# Patient Record
Sex: MALE | Race: WHITE
[De-identification: names, ages, dates, MRNs, and addresses within clinical notes are randomized per-mention and may not be internally consistent; named-entity substitution may affect disease eponyms.]

---

## 2017-04-10 ENCOUNTER — RX RENEWAL (OUTPATIENT)
Age: 62
End: 2017-04-10

## 2017-04-10 ENCOUNTER — MEDICATION RENEWAL (OUTPATIENT)
Age: 62
End: 2017-04-10

## 2017-05-23 ENCOUNTER — TRANSCRIPTION ENCOUNTER (OUTPATIENT)
Age: 62
End: 2017-05-23

## 2017-07-07 ENCOUNTER — APPOINTMENT (OUTPATIENT)
Dept: INTERNAL MEDICINE | Facility: CLINIC | Age: 62
End: 2017-07-07

## 2017-10-10 ENCOUNTER — RX RENEWAL (OUTPATIENT)
Age: 62
End: 2017-10-10

## 2018-04-11 ENCOUNTER — RX RENEWAL (OUTPATIENT)
Age: 63
End: 2018-04-11

## 2018-08-20 ENCOUNTER — MEDICATION RENEWAL (OUTPATIENT)
Age: 63
End: 2018-08-20

## 2018-08-23 ENCOUNTER — LABORATORY RESULT (OUTPATIENT)
Age: 63
End: 2018-08-23

## 2018-08-23 ENCOUNTER — RESULT REVIEW (OUTPATIENT)
Age: 63
End: 2018-08-23

## 2018-08-23 ENCOUNTER — APPOINTMENT (OUTPATIENT)
Dept: INTERNAL MEDICINE | Facility: CLINIC | Age: 63
End: 2018-08-23
Payer: COMMERCIAL

## 2018-08-23 VITALS
RESPIRATION RATE: 16 BRPM | WEIGHT: 169 LBS | BODY MASS INDEX: 24.2 KG/M2 | HEIGHT: 70 IN | OXYGEN SATURATION: 95 % | SYSTOLIC BLOOD PRESSURE: 128 MMHG | HEART RATE: 70 BPM | TEMPERATURE: 98.9 F | DIASTOLIC BLOOD PRESSURE: 80 MMHG

## 2018-08-23 PROCEDURE — 99214 OFFICE O/P EST MOD 30 MIN: CPT

## 2018-08-23 RX ORDER — LEVOTHYROXINE SODIUM 0.1 MG/1
100 TABLET ORAL DAILY
Qty: 30 | Refills: 2 | Status: DISCONTINUED | COMMUNITY
Start: 2017-04-10 | End: 1900-01-01

## 2018-08-23 RX ORDER — OCRELIZUMAB 300 MG/10ML
300 INJECTION INTRAVENOUS
Qty: 20 | Refills: 0 | Status: ACTIVE | COMMUNITY
Start: 2018-06-16

## 2018-08-23 NOTE — ASSESSMENT
[FreeTextEntry1] : Urinary tract infection, improvement in symptoms\par Multiple Sclerosis\par Hypothyroidism\par SEE Problem list in chart.

## 2018-08-23 NOTE — HISTORY OF PRESENT ILLNESS
[FreeTextEntry8] : Mr Monte is a very pleasant 63 y/o male with multiple sclerosis that has not been seen in our office in 2 years.  His wife called us to request medication for him for a UTI, he was experiencing urinary frequency, urgency with incontinence and mild burning sensation.  \par Cipro 500 mg, 1 Tab BID x 10 days was prescribed empirically and he is now on day 3 of treatment.  He reports that he feels improvement, no burning sensation, there is a decrease in frequency and urgency.  Denies abdominal pain, chills or fevers.\par He has had urologic problems in the past but even at our insistence he did not follow up with Dr. Garza on a regular basis.\par He DOES have an appointment with Urology in 6 days.\par In his difficult history with MS Mr. Monte has tried stem cell infusion in [place] and is currently taking Ocrevus through close follow up with Dr. Henderson.  At this point he is contemplating discontinuing Ocrevus therapy because "it makes him feel awful and does not think it is working."\par He started an OTC supplement called Protandim three weeks ago, an herbal + supplement that is "supposed to eliminate free radicals in cells" and he is hoping to benefit and feel better with this.\par Patient has been noncompliant with well visits and is overdue for comprehensive labs.  He has not had thyroid function testing done recently to his knowledge, and stopped Synthroid medication "a while ago."\par Please see previous notes.

## 2018-08-23 NOTE — PHYSICAL EXAM
[No Acute Distress] : no acute distress [Well Nourished] : well nourished [Normal Sclera/Conjunctiva] : normal sclera/conjunctiva [PERRL] : pupils equal round and reactive to light [Supple] : supple [No Respiratory Distress] : no respiratory distress  [Clear to Auscultation] : lungs were clear to auscultation bilaterally [Normal Rate] : normal rate  [Regular Rhythm] : with a regular rhythm [Normal S1, S2] : normal S1 and S2 [No Edema] : there was no peripheral edema [Soft] : abdomen soft [Non Tender] : non-tender [Normal Bowel Sounds] : normal bowel sounds [No Rash] : no rash [Coordination Grossly Intact] : coordination grossly intact [Normal Affect] : the affect was normal [Normal Insight/Judgement] : insight and judgment were intact [de-identified] : decreased strength and tone, muscle wasting lower extremities. [de-identified] : wheelchair bound

## 2018-08-23 NOTE — REVIEW OF SYSTEMS
[Fatigue] : fatigue [Chills] : no chills [Abdominal Pain] : no abdominal pain [Nausea] : no nausea [Incontinence] : incontinence [Hematuria] : no hematuria [Frequency] : frequency [Dizziness] : no dizziness [Fainting] : no fainting [Confusion] : no confusion [Unsteady Walk] : ataxia [Memory Loss] : memory loss [Suicidal] : not suicidal [Insomnia] : no insomnia [Anxiety] : no anxiety [Depression] : depression [Negative] : Musculoskeletal [FreeTextEntry8] : urgency [de-identified] : Under control with medications

## 2018-08-23 NOTE — PLAN
[FreeTextEntry1] : Patient will continue Cipro as prescribed 3 days ago for a full 10 day course.\par He assures me that he will keep appointment with Dr. Garza August 29 th.\par Increase fluids.\par Advised him need for comprehensive labs, especially since he stopped Synthroid on his own, agrees to have labs drawn.\par Call office if UTI symptoms do not continue to improve.\par Continue to follow closely with Dr. Pritchard.\par Encouraged CP with DB in near future.

## 2018-08-24 ENCOUNTER — RX RENEWAL (OUTPATIENT)
Age: 63
End: 2018-08-24

## 2019-02-12 ENCOUNTER — RX RENEWAL (OUTPATIENT)
Age: 64
End: 2019-02-12

## 2019-04-15 ENCOUNTER — APPOINTMENT (OUTPATIENT)
Dept: INTERNAL MEDICINE | Facility: CLINIC | Age: 64
End: 2019-04-15
Payer: COMMERCIAL

## 2019-04-15 ENCOUNTER — NON-APPOINTMENT (OUTPATIENT)
Age: 64
End: 2019-04-15

## 2019-04-15 VITALS
BODY MASS INDEX: 24.34 KG/M2 | HEART RATE: 76 BPM | SYSTOLIC BLOOD PRESSURE: 134 MMHG | RESPIRATION RATE: 18 BRPM | TEMPERATURE: 98.3 F | WEIGHT: 170 LBS | OXYGEN SATURATION: 96 % | DIASTOLIC BLOOD PRESSURE: 82 MMHG | HEIGHT: 70 IN

## 2019-04-15 DIAGNOSIS — J06.9 ACUTE UPPER RESPIRATORY INFECTION, UNSPECIFIED: ICD-10-CM

## 2019-04-15 DIAGNOSIS — R06.09 OTHER FORMS OF DYSPNEA: ICD-10-CM

## 2019-04-15 DIAGNOSIS — N30.00 ACUTE CYSTITIS W/OUT HEMATURIA: ICD-10-CM

## 2019-04-15 DIAGNOSIS — N20.0 CALCULUS OF KIDNEY: ICD-10-CM

## 2019-04-15 PROCEDURE — 99215 OFFICE O/P EST HI 40 MIN: CPT | Mod: 25

## 2019-04-15 PROCEDURE — 94060 EVALUATION OF WHEEZING: CPT

## 2019-04-15 RX ORDER — MODAFINIL 200 MG/1
200 TABLET ORAL
Refills: 0 | Status: ACTIVE | COMMUNITY

## 2019-04-15 RX ORDER — CIPROFLOXACIN HYDROCHLORIDE 500 MG/1
500 TABLET, FILM COATED ORAL
Qty: 20 | Refills: 0 | Status: DISCONTINUED | COMMUNITY
Start: 2018-08-20 | End: 2019-04-15

## 2019-04-15 RX ORDER — CLONIDINE 0.1 MG/24H
0.1 PATCH, EXTENDED RELEASE TRANSDERMAL
Refills: 0 | Status: ACTIVE | COMMUNITY

## 2019-04-15 RX ORDER — COLD-HOT PACK
EACH MISCELLANEOUS
Refills: 0 | Status: ACTIVE | COMMUNITY

## 2019-04-15 RX ORDER — ALBUTEROL SULFATE 90 UG/1
108 AEROSOL, METERED RESPIRATORY (INHALATION)
Refills: 0 | Status: ACTIVE | COMMUNITY

## 2019-04-15 RX ORDER — CLONAZEPAM 1 MG/1
1 TABLET ORAL
Refills: 0 | Status: ACTIVE | COMMUNITY

## 2019-04-15 RX ORDER — AMOXICILLIN AND CLAVULANATE POTASSIUM 875; 125 MG/1; MG/1
875-125 TABLET, COATED ORAL TWICE DAILY
Qty: 12 | Refills: 0 | Status: DISCONTINUED | COMMUNITY
Start: 2019-02-25 | End: 2019-04-15

## 2019-04-15 NOTE — HISTORY OF PRESENT ILLNESS
[FreeTextEntry1] : Followup evaluation [de-identified] : Mr. Monte presents for followup evaluation accompanied by his wife. Patient has a history of multiple sclerosis for many years. He is followed by Dr. Aj Monroy for neurology. Patient was recently admitted to [facility] with urinary tract infection and sepsis. Mr. Monte has a history of recurrent urinary tract infection secondary to his multiple sclerosis. He has just finished a course of ciprofloxacin. Neurology followup by Dr. Jerome Marie. While in [facility] the patient had a CT scan of the abdomen and pelvis which apparently was suspicious for esophageal carcinoma. After discharge Mr. Monte had an upper endoscopy by Dr. Barry Glanzmann which showed no evidence of esophageal carcinoma. Patient was also given the Colguard test. Mr. Monte does occasionally have shortness of breath with exertion. He is wheelchair-bound. He had recently been on Ocrevus infusions for his multiple sclerosis however her these are discontinued. He complains of episodes of diplopia. He has a history of nystagmus.

## 2019-04-15 NOTE — PHYSICAL EXAM
[No Acute Distress] : no acute distress [Well Developed] : well developed [Well Nourished] : well nourished [Well-Appearing] : well-appearing [Normal Sclera/Conjunctiva] : normal sclera/conjunctiva [PERRL] : pupils equal round and reactive to light [EOMI] : extraocular movements intact [Normal Outer Ear/Nose] : the outer ears and nose were normal in appearance [Normal Oropharynx] : the oropharynx was normal [No JVD] : no jugular venous distention [No Lymphadenopathy] : no lymphadenopathy [Supple] : supple [Thyroid Normal, No Nodules] : the thyroid was normal and there were no nodules present [No Respiratory Distress] : no respiratory distress  [Clear to Auscultation] : lungs were clear to auscultation bilaterally [No Accessory Muscle Use] : no accessory muscle use [Normal Rate] : normal rate  [Regular Rhythm] : with a regular rhythm [No Murmur] : no murmur heard [Normal S1, S2] : normal S1 and S2 [No Carotid Bruits] : no carotid bruits [No Abdominal Bruit] : a ~M bruit was not heard ~T in the abdomen [No Varicosities] : no varicosities [Pedal Pulses Present] : the pedal pulses are present [No Extremity Clubbing/Cyanosis] : no extremity clubbing/cyanosis [No Edema] : there was no peripheral edema [No Palpable Aorta] : no palpable aorta [Soft] : abdomen soft [Non-distended] : non-distended [Non Tender] : non-tender [No HSM] : no HSM [No Masses] : no abdominal mass palpated [Normal Bowel Sounds] : normal bowel sounds [Normal Posterior Cervical Nodes] : no posterior cervical lymphadenopathy [Normal Anterior Cervical Nodes] : no anterior cervical lymphadenopathy [No Spinal Tenderness] : no spinal tenderness [No CVA Tenderness] : no CVA  tenderness [No Joint Swelling] : no joint swelling [No Rash] : no rash [Normal Gait] : normal gait [Coordination Grossly Intact] : coordination grossly intact [No Focal Deficits] : no focal deficits [Deep Tendon Reflexes (DTR)] : deep tendon reflexes were 2+ and symmetric [Normal Affect] : the affect was normal [Normal Insight/Judgement] : insight and judgment were intact [de-identified] : mild spasticity [de-identified] : Right exophthalmos

## 2019-04-15 NOTE — DATA REVIEWED
[FreeTextEntry1] : Spirometry pre-and postbronchodilator therapy shows moderate restrictive lung disease. FEV1 is 2.16 L which is 64% predicted. FEV1 percent is 77%.

## 2019-04-15 NOTE — PHYSICAL EXAM
[No Acute Distress] : no acute distress [Well Developed] : well developed [Well Nourished] : well nourished [Normal Sclera/Conjunctiva] : normal sclera/conjunctiva [Well-Appearing] : well-appearing [EOMI] : extraocular movements intact [PERRL] : pupils equal round and reactive to light [Normal Outer Ear/Nose] : the outer ears and nose were normal in appearance [Normal Oropharynx] : the oropharynx was normal [No JVD] : no jugular venous distention [Supple] : supple [No Lymphadenopathy] : no lymphadenopathy [Thyroid Normal, No Nodules] : the thyroid was normal and there were no nodules present [No Respiratory Distress] : no respiratory distress  [Clear to Auscultation] : lungs were clear to auscultation bilaterally [No Accessory Muscle Use] : no accessory muscle use [Normal Rate] : normal rate  [Regular Rhythm] : with a regular rhythm [Normal S1, S2] : normal S1 and S2 [No Murmur] : no murmur heard [No Carotid Bruits] : no carotid bruits [No Abdominal Bruit] : a ~M bruit was not heard ~T in the abdomen [No Varicosities] : no varicosities [Pedal Pulses Present] : the pedal pulses are present [No Edema] : there was no peripheral edema [No Extremity Clubbing/Cyanosis] : no extremity clubbing/cyanosis [No Palpable Aorta] : no palpable aorta [Soft] : abdomen soft [Non-distended] : non-distended [Non Tender] : non-tender [No HSM] : no HSM [No Masses] : no abdominal mass palpated [Normal Bowel Sounds] : normal bowel sounds [Normal Posterior Cervical Nodes] : no posterior cervical lymphadenopathy [Normal Anterior Cervical Nodes] : no anterior cervical lymphadenopathy [No CVA Tenderness] : no CVA  tenderness [No Spinal Tenderness] : no spinal tenderness [No Joint Swelling] : no joint swelling [No Rash] : no rash [Normal Gait] : normal gait [Coordination Grossly Intact] : coordination grossly intact [No Focal Deficits] : no focal deficits [Deep Tendon Reflexes (DTR)] : deep tendon reflexes were 2+ and symmetric [Normal Affect] : the affect was normal [Normal Insight/Judgement] : insight and judgment were intact [de-identified] : mild spasticity [de-identified] : Right exophthalmos

## 2019-04-15 NOTE — PLAN
[FreeTextEntry1] : Debby Summers is a 63-year-old male with a history of multiple sclerosis. Heels has a history of recurrent urinary tract infections. The patient was recently in [facility] for an episode of urinary tract infection with sepsis. He will continue urology follow up with Dr. Marie. Mr. Summers will also continue follow with Dr. tay for his multiple sclerosis. Patient is be given a prescription for comprehensive blood profile. He will continue on metoprolol XL 12.5 mg daily. Followup in 6 weeks.

## 2019-04-15 NOTE — HISTORY OF PRESENT ILLNESS
[FreeTextEntry1] : Followup evaluation [de-identified] : Mr. Monte presents for followup evaluation accompanied by his wife. Patient has a history of multiple sclerosis for many years. He is followed by Dr. Aj Monroy for neurology. Patient was recently admitted to [facility] with urinary tract infection and sepsis. Mr. Monte has a history of recurrent urinary tract infection secondary to his multiple sclerosis. He has just finished a course of ciprofloxacin. Neurology followup by Dr. Jerome Marie. While in [facility] the patient had a CT scan of the abdomen and pelvis which apparently was suspicious for esophageal carcinoma. After discharge Mr. Monte had an upper endoscopy by Dr. Barry Glanzmann which showed no evidence of esophageal carcinoma. Patient was also given the Colguard test. Mr. Monte does occasionally have shortness of breath with exertion. He is wheelchair-bound. He had recently been on Ocrevus infusions for his multiple sclerosis however her these are discontinued. He complains of episodes of diplopia. He has a history of nystagmus.

## 2019-04-15 NOTE — PLAN
[FreeTextEntry1] : Debby Summers is a 63-year-old male with a history of multiple sclerosis. Heels has a history of recurrent urinary tract infections. The patient was recently in [facility] for an episode of urinary tract infection with sepsis. He will continue urology follow up with Dr. Marie. Mr. Summers will also continue follow with Dr. tay for his multiple sclerosis. Patient is be given a prescription for comprehensive blood profile. He will continue on metoprolol XL 12.5 mg daily. Followup in 6 weeks.

## 2019-04-22 ENCOUNTER — APPOINTMENT (OUTPATIENT)
Dept: FAMILY MEDICINE | Facility: CLINIC | Age: 64
End: 2019-04-22

## 2019-04-29 ENCOUNTER — MEDICATION RENEWAL (OUTPATIENT)
Age: 64
End: 2019-04-29

## 2019-05-08 ENCOUNTER — INPATIENT (INPATIENT)
Facility: HOSPITAL | Age: 64
LOS: 4 days | Discharge: HOME | End: 2019-05-13
Attending: INTERNAL MEDICINE | Admitting: INTERNAL MEDICINE
Payer: COMMERCIAL

## 2019-05-08 VITALS
RESPIRATION RATE: 18 BRPM | HEART RATE: 112 BPM | OXYGEN SATURATION: 93 % | TEMPERATURE: 100 F | DIASTOLIC BLOOD PRESSURE: 86 MMHG | WEIGHT: 169.98 LBS | SYSTOLIC BLOOD PRESSURE: 166 MMHG | HEIGHT: 70 IN

## 2019-05-08 LAB
ALBUMIN SERPL ELPH-MCNC: 2.8 G/DL — LOW (ref 3.3–5)
ALBUMIN SERPL ELPH-MCNC: 3.5 G/DL — SIGNIFICANT CHANGE UP (ref 3.3–5)
ALP SERPL-CCNC: 114 U/L — SIGNIFICANT CHANGE UP (ref 40–120)
ALP SERPL-CCNC: 126 U/L — HIGH (ref 40–120)
ALT FLD-CCNC: 49 U/L — SIGNIFICANT CHANGE UP (ref 12–78)
ALT FLD-CCNC: 59 U/L — SIGNIFICANT CHANGE UP (ref 12–78)
ANION GAP SERPL CALC-SCNC: 5 MMOL/L — SIGNIFICANT CHANGE UP (ref 5–17)
ANION GAP SERPL CALC-SCNC: 5 MMOL/L — SIGNIFICANT CHANGE UP (ref 5–17)
APPEARANCE UR: CLEAR — SIGNIFICANT CHANGE UP
APTT BLD: 32 SEC — SIGNIFICANT CHANGE UP (ref 27.5–36.3)
AST SERPL-CCNC: 24 U/L — SIGNIFICANT CHANGE UP (ref 15–37)
AST SERPL-CCNC: 31 U/L — SIGNIFICANT CHANGE UP (ref 15–37)
BACTERIA # UR AUTO: ABNORMAL
BASOPHILS # BLD AUTO: 0.02 K/UL — SIGNIFICANT CHANGE UP (ref 0–0.2)
BASOPHILS # BLD AUTO: 0.02 K/UL — SIGNIFICANT CHANGE UP (ref 0–0.2)
BASOPHILS NFR BLD AUTO: 0.2 % — SIGNIFICANT CHANGE UP (ref 0–2)
BASOPHILS NFR BLD AUTO: 0.3 % — SIGNIFICANT CHANGE UP (ref 0–2)
BILIRUB SERPL-MCNC: 0.5 MG/DL — SIGNIFICANT CHANGE UP (ref 0.2–1.2)
BILIRUB SERPL-MCNC: 0.5 MG/DL — SIGNIFICANT CHANGE UP (ref 0.2–1.2)
BILIRUB UR-MCNC: NEGATIVE — SIGNIFICANT CHANGE UP
BUN SERPL-MCNC: 10 MG/DL — SIGNIFICANT CHANGE UP (ref 7–23)
BUN SERPL-MCNC: 13 MG/DL — SIGNIFICANT CHANGE UP (ref 7–23)
CALCIUM SERPL-MCNC: 8.2 MG/DL — LOW (ref 8.5–10.1)
CALCIUM SERPL-MCNC: 8.6 MG/DL — SIGNIFICANT CHANGE UP (ref 8.5–10.1)
CHLORIDE SERPL-SCNC: 106 MMOL/L — SIGNIFICANT CHANGE UP (ref 96–108)
CHLORIDE SERPL-SCNC: 108 MMOL/L — SIGNIFICANT CHANGE UP (ref 96–108)
CO2 SERPL-SCNC: 25 MMOL/L — SIGNIFICANT CHANGE UP (ref 22–31)
CO2 SERPL-SCNC: 27 MMOL/L — SIGNIFICANT CHANGE UP (ref 22–31)
COLOR SPEC: YELLOW — SIGNIFICANT CHANGE UP
CREAT SERPL-MCNC: 0.98 MG/DL — SIGNIFICANT CHANGE UP (ref 0.5–1.3)
CREAT SERPL-MCNC: 1.01 MG/DL — SIGNIFICANT CHANGE UP (ref 0.5–1.3)
DIFF PNL FLD: ABNORMAL
EOSINOPHIL # BLD AUTO: 0.03 K/UL — SIGNIFICANT CHANGE UP (ref 0–0.5)
EOSINOPHIL # BLD AUTO: 0.05 K/UL — SIGNIFICANT CHANGE UP (ref 0–0.5)
EOSINOPHIL NFR BLD AUTO: 0.4 % — SIGNIFICANT CHANGE UP (ref 0–6)
EOSINOPHIL NFR BLD AUTO: 0.6 % — SIGNIFICANT CHANGE UP (ref 0–6)
EPI CELLS # UR: SIGNIFICANT CHANGE UP
GLUCOSE SERPL-MCNC: 196 MG/DL — HIGH (ref 70–99)
GLUCOSE SERPL-MCNC: 208 MG/DL — HIGH (ref 70–99)
GLUCOSE UR QL: 250 MG/DL
HBA1C BLD-MCNC: 7.4 % — HIGH (ref 4–5.6)
HCT VFR BLD CALC: 39.1 % — SIGNIFICANT CHANGE UP (ref 39–50)
HCT VFR BLD CALC: 42 % — SIGNIFICANT CHANGE UP (ref 39–50)
HCV AB S/CO SERPL IA: 0.16 S/CO — SIGNIFICANT CHANGE UP (ref 0–0.99)
HCV AB SERPL-IMP: SIGNIFICANT CHANGE UP
HGB BLD-MCNC: 13 G/DL — SIGNIFICANT CHANGE UP (ref 13–17)
HGB BLD-MCNC: 14.2 G/DL — SIGNIFICANT CHANGE UP (ref 13–17)
HPIV1 RNA SPEC QL NAA+PROBE: DETECTED
IMM GRANULOCYTES NFR BLD AUTO: 0.5 % — SIGNIFICANT CHANGE UP (ref 0–1.5)
IMM GRANULOCYTES NFR BLD AUTO: 0.6 % — SIGNIFICANT CHANGE UP (ref 0–1.5)
INR BLD: 1.04 RATIO — SIGNIFICANT CHANGE UP (ref 0.88–1.16)
KETONES UR-MCNC: ABNORMAL
LACTATE SERPL-SCNC: 1.8 MMOL/L — SIGNIFICANT CHANGE UP (ref 0.7–2)
LEUKOCYTE ESTERASE UR-ACNC: ABNORMAL
LYMPHOCYTES # BLD AUTO: 0.74 K/UL — LOW (ref 1–3.3)
LYMPHOCYTES # BLD AUTO: 1.91 K/UL — SIGNIFICANT CHANGE UP (ref 1–3.3)
LYMPHOCYTES # BLD AUTO: 21.2 % — SIGNIFICANT CHANGE UP (ref 13–44)
LYMPHOCYTES # BLD AUTO: 9.4 % — LOW (ref 13–44)
MAGNESIUM SERPL-MCNC: 1.8 MG/DL — SIGNIFICANT CHANGE UP (ref 1.6–2.6)
MCHC RBC-ENTMCNC: 31.3 PG — SIGNIFICANT CHANGE UP (ref 27–34)
MCHC RBC-ENTMCNC: 31.6 PG — SIGNIFICANT CHANGE UP (ref 27–34)
MCHC RBC-ENTMCNC: 33.2 GM/DL — SIGNIFICANT CHANGE UP (ref 32–36)
MCHC RBC-ENTMCNC: 33.8 GM/DL — SIGNIFICANT CHANGE UP (ref 32–36)
MCV RBC AUTO: 93.5 FL — SIGNIFICANT CHANGE UP (ref 80–100)
MCV RBC AUTO: 94.2 FL — SIGNIFICANT CHANGE UP (ref 80–100)
MONOCYTES # BLD AUTO: 0.7 K/UL — SIGNIFICANT CHANGE UP (ref 0–0.9)
MONOCYTES # BLD AUTO: 0.99 K/UL — HIGH (ref 0–0.9)
MONOCYTES NFR BLD AUTO: 11 % — SIGNIFICANT CHANGE UP (ref 2–14)
MONOCYTES NFR BLD AUTO: 8.9 % — SIGNIFICANT CHANGE UP (ref 2–14)
NEUTROPHILS # BLD AUTO: 5.99 K/UL — SIGNIFICANT CHANGE UP (ref 1.8–7.4)
NEUTROPHILS # BLD AUTO: 6.32 K/UL — SIGNIFICANT CHANGE UP (ref 1.8–7.4)
NEUTROPHILS NFR BLD AUTO: 66.4 % — SIGNIFICANT CHANGE UP (ref 43–77)
NEUTROPHILS NFR BLD AUTO: 80.5 % — HIGH (ref 43–77)
NITRITE UR-MCNC: POSITIVE
NRBC # BLD: 0 /100 WBCS — SIGNIFICANT CHANGE UP (ref 0–0)
NRBC # BLD: 0 /100 WBCS — SIGNIFICANT CHANGE UP (ref 0–0)
PH UR: 5 — SIGNIFICANT CHANGE UP (ref 5–8)
PHOSPHATE SERPL-MCNC: 2.5 MG/DL — SIGNIFICANT CHANGE UP (ref 2.5–4.5)
PLATELET # BLD AUTO: 161 K/UL — SIGNIFICANT CHANGE UP (ref 150–400)
PLATELET # BLD AUTO: 196 K/UL — SIGNIFICANT CHANGE UP (ref 150–400)
POTASSIUM SERPL-MCNC: 3.5 MMOL/L — SIGNIFICANT CHANGE UP (ref 3.5–5.3)
POTASSIUM SERPL-MCNC: 3.7 MMOL/L — SIGNIFICANT CHANGE UP (ref 3.5–5.3)
POTASSIUM SERPL-SCNC: 3.5 MMOL/L — SIGNIFICANT CHANGE UP (ref 3.5–5.3)
POTASSIUM SERPL-SCNC: 3.7 MMOL/L — SIGNIFICANT CHANGE UP (ref 3.5–5.3)
PROT SERPL-MCNC: 6.1 GM/DL — SIGNIFICANT CHANGE UP (ref 6–8.3)
PROT SERPL-MCNC: 7.2 GM/DL — SIGNIFICANT CHANGE UP (ref 6–8.3)
PROT UR-MCNC: 30 MG/DL
PROTHROM AB SERPL-ACNC: 11.6 SEC — SIGNIFICANT CHANGE UP (ref 10–12.9)
RAPID RVP RESULT: DETECTED
RBC # BLD: 4.15 M/UL — LOW (ref 4.2–5.8)
RBC # BLD: 4.49 M/UL — SIGNIFICANT CHANGE UP (ref 4.2–5.8)
RBC # FLD: 12.5 % — SIGNIFICANT CHANGE UP (ref 10.3–14.5)
RBC # FLD: 12.6 % — SIGNIFICANT CHANGE UP (ref 10.3–14.5)
RBC CASTS # UR COMP ASSIST: SIGNIFICANT CHANGE UP /HPF (ref 0–4)
SODIUM SERPL-SCNC: 138 MMOL/L — SIGNIFICANT CHANGE UP (ref 135–145)
SODIUM SERPL-SCNC: 138 MMOL/L — SIGNIFICANT CHANGE UP (ref 135–145)
SP GR SPEC: 1.02 — SIGNIFICANT CHANGE UP (ref 1.01–1.02)
UROBILINOGEN FLD QL: NEGATIVE MG/DL — SIGNIFICANT CHANGE UP
WBC # BLD: 7.85 K/UL — SIGNIFICANT CHANGE UP (ref 3.8–10.5)
WBC # BLD: 9.01 K/UL — SIGNIFICANT CHANGE UP (ref 3.8–10.5)
WBC # FLD AUTO: 7.85 K/UL — SIGNIFICANT CHANGE UP (ref 3.8–10.5)
WBC # FLD AUTO: 9.01 K/UL — SIGNIFICANT CHANGE UP (ref 3.8–10.5)
WBC UR QL: ABNORMAL

## 2019-05-08 PROCEDURE — 93010 ELECTROCARDIOGRAM REPORT: CPT

## 2019-05-08 PROCEDURE — 71250 CT THORAX DX C-: CPT | Mod: 26

## 2019-05-08 PROCEDURE — 99285 EMERGENCY DEPT VISIT HI MDM: CPT | Mod: 25

## 2019-05-08 PROCEDURE — 71045 X-RAY EXAM CHEST 1 VIEW: CPT | Mod: 26

## 2019-05-08 RX ORDER — CEFTRIAXONE 500 MG/1
1000 INJECTION, POWDER, FOR SOLUTION INTRAMUSCULAR; INTRAVENOUS ONCE
Qty: 0 | Refills: 0 | Status: COMPLETED | OUTPATIENT
Start: 2019-05-08 | End: 2019-05-08

## 2019-05-08 RX ORDER — HEPARIN SODIUM 5000 [USP'U]/ML
5000 INJECTION INTRAVENOUS; SUBCUTANEOUS EVERY 12 HOURS
Qty: 0 | Refills: 0 | Status: DISCONTINUED | OUTPATIENT
Start: 2019-05-08 | End: 2019-05-13

## 2019-05-08 RX ORDER — AZITHROMYCIN 500 MG/1
500 TABLET, FILM COATED ORAL DAILY
Qty: 0 | Refills: 0 | Status: DISCONTINUED | OUTPATIENT
Start: 2019-05-08 | End: 2019-05-12

## 2019-05-08 RX ORDER — METOPROLOL TARTRATE 50 MG
25 TABLET ORAL DAILY
Qty: 0 | Refills: 0 | Status: DISCONTINUED | OUTPATIENT
Start: 2019-05-08 | End: 2019-05-13

## 2019-05-08 RX ORDER — VANCOMYCIN HCL 1 G
1000 VIAL (EA) INTRAVENOUS EVERY 12 HOURS
Qty: 0 | Refills: 0 | Status: DISCONTINUED | OUTPATIENT
Start: 2019-05-08 | End: 2019-05-08

## 2019-05-08 RX ORDER — POLYETHYLENE GLYCOL 3350 17 G/17G
17 POWDER, FOR SOLUTION ORAL
Qty: 0 | Refills: 0 | Status: DISCONTINUED | OUTPATIENT
Start: 2019-05-08 | End: 2019-05-13

## 2019-05-08 RX ORDER — MODAFINIL 200 MG/1
200 TABLET ORAL DAILY
Qty: 0 | Refills: 0 | Status: DISCONTINUED | OUTPATIENT
Start: 2019-05-08 | End: 2019-05-13

## 2019-05-08 RX ORDER — DOCUSATE SODIUM 100 MG
100 CAPSULE ORAL THREE TIMES A DAY
Qty: 0 | Refills: 0 | Status: DISCONTINUED | OUTPATIENT
Start: 2019-05-08 | End: 2019-05-13

## 2019-05-08 RX ORDER — ASPIRIN/CALCIUM CARB/MAGNESIUM 324 MG
81 TABLET ORAL DAILY
Qty: 0 | Refills: 0 | Status: DISCONTINUED | OUTPATIENT
Start: 2019-05-08 | End: 2019-05-13

## 2019-05-08 RX ORDER — ACETAMINOPHEN 500 MG
1000 TABLET ORAL ONCE
Qty: 0 | Refills: 0 | Status: COMPLETED | OUTPATIENT
Start: 2019-05-08 | End: 2019-05-08

## 2019-05-08 RX ORDER — ALBUTEROL 90 UG/1
1 AEROSOL, METERED ORAL EVERY 6 HOURS
Qty: 0 | Refills: 0 | Status: DISCONTINUED | OUTPATIENT
Start: 2019-05-08 | End: 2019-05-09

## 2019-05-08 RX ORDER — CEFEPIME 1 G/1
1000 INJECTION, POWDER, FOR SOLUTION INTRAMUSCULAR; INTRAVENOUS EVERY 12 HOURS
Qty: 0 | Refills: 0 | Status: DISCONTINUED | OUTPATIENT
Start: 2019-05-08 | End: 2019-05-08

## 2019-05-08 RX ORDER — LEVOTHYROXINE SODIUM 125 MCG
75 TABLET ORAL DAILY
Qty: 0 | Refills: 0 | Status: DISCONTINUED | OUTPATIENT
Start: 2019-05-08 | End: 2019-05-13

## 2019-05-08 RX ORDER — CEFEPIME 1 G/1
2000 INJECTION, POWDER, FOR SOLUTION INTRAMUSCULAR; INTRAVENOUS EVERY 12 HOURS
Qty: 0 | Refills: 0 | Status: DISCONTINUED | OUTPATIENT
Start: 2019-05-08 | End: 2019-05-13

## 2019-05-08 RX ORDER — VANCOMYCIN HCL 1 G
1000 VIAL (EA) INTRAVENOUS ONCE
Qty: 0 | Refills: 0 | Status: COMPLETED | OUTPATIENT
Start: 2019-05-08 | End: 2019-05-08

## 2019-05-08 RX ORDER — CLONAZEPAM 1 MG
1 TABLET ORAL DAILY
Refills: 0 | Status: DISCONTINUED | OUTPATIENT
Start: 2019-05-08 | End: 2019-05-13

## 2019-05-08 RX ORDER — AZITHROMYCIN 500 MG/1
500 TABLET, FILM COATED ORAL ONCE
Qty: 0 | Refills: 0 | Status: COMPLETED | OUTPATIENT
Start: 2019-05-08 | End: 2019-05-08

## 2019-05-08 RX ORDER — CEFTRIAXONE 500 MG/1
1 INJECTION, POWDER, FOR SOLUTION INTRAMUSCULAR; INTRAVENOUS ONCE
Qty: 0 | Refills: 0 | Status: DISCONTINUED | OUTPATIENT
Start: 2019-05-08 | End: 2019-05-08

## 2019-05-08 RX ORDER — SODIUM CHLORIDE 9 MG/ML
2400 INJECTION, SOLUTION INTRAVENOUS ONCE
Qty: 0 | Refills: 0 | Status: COMPLETED | OUTPATIENT
Start: 2019-05-08 | End: 2019-05-08

## 2019-05-08 RX ORDER — ACETAMINOPHEN 500 MG
650 TABLET ORAL EVERY 6 HOURS
Qty: 0 | Refills: 0 | Status: DISCONTINUED | OUTPATIENT
Start: 2019-05-08 | End: 2019-05-13

## 2019-05-08 RX ADMIN — Medication 100 MILLIGRAM(S): at 12:13

## 2019-05-08 RX ADMIN — Medication 40 MILLIGRAM(S): at 20:50

## 2019-05-08 RX ADMIN — Medication 125 MILLIGRAM(S): at 14:52

## 2019-05-08 RX ADMIN — Medication 1000 MILLIGRAM(S): at 03:46

## 2019-05-08 RX ADMIN — AZITHROMYCIN 255 MILLIGRAM(S): 500 TABLET, FILM COATED ORAL at 00:52

## 2019-05-08 RX ADMIN — MODAFINIL 200 MILLIGRAM(S): 200 TABLET ORAL at 14:47

## 2019-05-08 RX ADMIN — Medication 1000 MILLIGRAM(S): at 00:40

## 2019-05-08 RX ADMIN — POLYETHYLENE GLYCOL 3350 17 GRAM(S): 17 POWDER, FOR SOLUTION ORAL at 18:29

## 2019-05-08 RX ADMIN — SODIUM CHLORIDE 2400 MILLILITER(S): 9 INJECTION, SOLUTION INTRAVENOUS at 01:52

## 2019-05-08 RX ADMIN — HEPARIN SODIUM 5000 UNIT(S): 5000 INJECTION INTRAVENOUS; SUBCUTANEOUS at 16:42

## 2019-05-08 RX ADMIN — Medication 1200 MILLIGRAM(S): at 16:42

## 2019-05-08 RX ADMIN — CEFEPIME 1000 MILLIGRAM(S): 1 INJECTION, POWDER, FOR SOLUTION INTRAMUSCULAR; INTRAVENOUS at 02:26

## 2019-05-08 RX ADMIN — SODIUM CHLORIDE 2400 MILLILITER(S): 9 INJECTION, SOLUTION INTRAVENOUS at 00:35

## 2019-05-08 RX ADMIN — Medication 75 MICROGRAM(S): at 06:28

## 2019-05-08 RX ADMIN — Medication 25 MILLIGRAM(S): at 06:28

## 2019-05-08 RX ADMIN — CEFEPIME 100 MILLIGRAM(S): 1 INJECTION, POWDER, FOR SOLUTION INTRAMUSCULAR; INTRAVENOUS at 16:41

## 2019-05-08 RX ADMIN — CEFTRIAXONE 1000 MILLIGRAM(S): 500 INJECTION, POWDER, FOR SOLUTION INTRAMUSCULAR; INTRAVENOUS at 00:52

## 2019-05-08 RX ADMIN — Medication 1000 MILLIGRAM(S): at 03:27

## 2019-05-08 RX ADMIN — AZITHROMYCIN 500 MILLIGRAM(S): 500 TABLET, FILM COATED ORAL at 12:12

## 2019-05-08 RX ADMIN — AZITHROMYCIN 500 MILLIGRAM(S): 500 TABLET, FILM COATED ORAL at 01:53

## 2019-05-08 RX ADMIN — Medication 81 MILLIGRAM(S): at 12:12

## 2019-05-08 RX ADMIN — Medication 250 MILLIGRAM(S): at 02:26

## 2019-05-08 NOTE — PROGRESS NOTE ADULT - ASSESSMENT
64 y/o M with PMH of multiple sclerosis, h/o recurrent UTIs, hypothyroidism, nephrolithiasis, HTN, recently admitted to [facility] for UTI (currently on marobid), P/w cough    *Sepsis - possibly 2/2 GNR PNA with superimposed parainfluenza infection and UTI  -CT chest RLL PNA  -C/w azithro  and cefepime  -Start solumedrol  -Start Mucinex  -Nebs ATC  -aspiration precautions  -Isolation for parainfluenza infection   -IVF  -ID consult appreciated   -Pulm consult     *H/o Multiple sclerosis / hypothyroidism / HTN   -C/w home meds and outpatient management if conditions remain stable during hospitalization     *DVT ppx  -SCDs 64 y/o M with PMH of multiple sclerosis, h/o recurrent UTIs, hypothyroidism, nephrolithiasis, HTN, recently admitted to [facility] for UTI (currently on marobid), P/w cough    *Sepsis - possibly 2/2 GNR PNA with superimposed parainfluenza infection and UTI  -CT chest RLL PNA  -C/w azithro  and cefepime  -Start solumedrol  -Start Mucinex  -Nebs ATC  -aspiration precautions  -Isolation for parainfluenza infection   -IVF  -ID consult appreciated   -Pulm consult     *H/o Multiple sclerosis / hypothyroidism / HTN   -C/w home meds and outpatient management if conditions remain stable during hospitalization     *DVT ppx  -Heparin SQ

## 2019-05-08 NOTE — ED ADULT NURSE NOTE - PSH
H/O angioplasty  2011 chronic cerebrospinal vascular insufficiency - treatment for MS by Dr Trace López  H/O lithotripsy  2010  History of tonsillectomy

## 2019-05-08 NOTE — PROGRESS NOTE ADULT - SUBJECTIVE AND OBJECTIVE BOX
HOSPITALIST PROGRESS NOTE:  SUBJECTIVE:  PCP: PCP: Evelin  Neuro: Aj Monroy   : Jose Cruzcarlalexey Simpsoneduardojohanny    Chief Complaint: Patient is a 63y old  Male who presents with a chief complaint of fever (08 May 2019 11:40)      HPI:  64 y/o M with PMH of multiple sclerosis, h/o recurrent UTIs, hypothyroidism, hiatal hernia, nephrolithiasis, HTN, recently admitted to [facility] for UTI, P/w cough and fever. Patient started to have cough productive of yellow sputum on  and a fever of 101 degrees. Patient was recently hospitalized at [facility] for 3 nights and treated for UTI. Denies nausea, vomiting abdominal pain, CP, SOB, dysuria, increased urinary frequency     :  Above Reviewed; Patient continues to have cough, wheezing and dyspnea; Not improving     Allergies:  No Known Allergies    REVIEW OF SYSTEMS:  See HPI. All other review of systems is negative unless indicated above.     OBJECTIVE  Physical Exam:  Vital Signs:  Height (cm): 177.8 ( @ 00:07)  Weight (kg): 77.1 ( @ 00:07)  BMI (kg/m2): 24.4 ( @ 00:07)  BSA (m2): 1.95 ( @ 00:07)  Vital Signs Last 24 Hrs  T(C): 37.7 (08 May 2019 10:47), Max: 39.1 (08 May 2019 00:26)  T(F): 99.8 (08 May 2019 10:47), Max: 102.4 (08 May 2019 00:26)  HR: 90 (08 May 2019 10:47) (75 - 112)  BP: 147/88 (08 May 2019 10:47) (127/68 - 168/82)  BP(mean): --  RR: 19 (08 May 2019 10:47) (18 - 22)  SpO2: 97% (08 May 2019 10:47) (93% - 97%)  I&O's Summary      Constitutional: NAD, awake and alert, well-developed  Neurological: AAO x 3, no focal deficits; LE weakness 2ndry to MS  HEENT: PERRLA, EOMI, MMM  Neck: Soft and supple, No LAD, No JVD  Respiratory: Breath sounds are clear bilaterally, + wheezing and rhonchi B/L  Cardiovascular: S1 and S2, regular rate and rhythm; no Murmurs, gallops or rubs  Gastrointestinal: Bowel Sounds present, soft, nontender, nondistended, no guarding, no rebound tenderness  Back: No CVA tenderness   Extremities: No peripheral edema  Vascular: 2+ peripheral pulses  Musculoskeletal: LE weakness B/L 2ndry to MS  Skin: No rashes  Breast: Deferred  Rectal: Deferred    MEDICATIONS  (STANDING):  aspirin enteric coated 81 milliGRAM(s) Oral daily  azithromycin   Tablet 500 milliGRAM(s) Oral daily  cefepime   IVPB 2000 milliGRAM(s) IV Intermittent every 12 hours  levothyroxine 75 MICROGram(s) Oral daily  metoprolol succinate ER 25 milliGRAM(s) Oral daily  modafinil 200 milliGRAM(s) Oral daily      LABS: All Labs Reviewed:                        13.0   9.01  )-----------( 161      ( 08 May 2019 07:24 )             39.1     05-08    138  |  106  |  10  ----------------------------<  208<H>  3.5   |  27  |  0.98    Ca    8.2<L>      08 May 2019 07:24  Phos  2.5     05-08  Mg     1.8     -08    TPro  6.1  /  Alb  2.8<L>  /  TBili  0.5  /  DBili  x   /  AST  24  /  ALT  49  /  AlkPhos  114  05-08    PT/INR - ( 08 May 2019 00:22 )   PT: 11.6 sec;   INR: 1.04 ratio         PTT - ( 08 May 2019 00:22 )  PTT:32.0 sec        Urinalysis Basic - ( 08 May 2019 01:08 )    Color: Yellow / Appearance: Clear / S.020 / pH: x  Gluc: x / Ketone: Trace  / Bili: Negative / Urobili: Negative mg/dL   Blood: x / Protein: 30 mg/dL / Nitrite: Positive   Leuk Esterase: Moderate / RBC: 0-2 /HPF / WBC 11-25   Sq Epi: x / Non Sq Epi: Few / Bacteria: Moderate      RADIOLOGY/EKG:    < from: Xray Chest 1 View-PORTABLE IMMEDIATE (19 @ 00:49) >    Impression:    No acute disease    < end of copied text >    < from: CT Chest No Cont (19 @ 10:09) >    IMPRESSION: Posterior right lower lobe airspace disease suspect for   pneumonia.    < end of copied text >    < from: CT Chest No Cont (19 @ 10:09) >    IMPRESSION: Posterior right lower lobe airspace disease suspect for   pneumonia.    < end of copied text >

## 2019-05-08 NOTE — H&P ADULT - HISTORY OF PRESENT ILLNESS
64 y/o M with PMH of multiple sclerosis, h/o recurrent UTIs, hypothyroidism, hiatal hernia, nephrolithiasis, HTN, recently admitted to [facility] for UTI, P/w cough and fever. Patient started to have cough productive of yellow sputum on Sunday and a fever of 101 degrees. Patient was recently hospitalized at [facility] for 3 nights and treated for UTI. Denies nausea, vomiting abdominal pain, CP, SOB, dysuria, increased urinary frequency     PSH: Tonsillectomy     Social hx: Denies x 3    Family Hx: Denies

## 2019-05-08 NOTE — CONSULT NOTE ADULT - ASSESSMENT
62 y/o Male with h/o multiple sclerosis, h/o recurrent UTIs, hypothyroidism, hiatal hernia, nephrolithiasis, HTN was admitted on 5/8 for cough and fever. Patient developed cough productive of yellow sputum x 3 days and a fever of 101F. In ER he was noted with fever to 102.4F and received cefepime.     1. Febrile syndrome. Possible sepsis. Pyuria. Possible UTI. Cough. RLL pneumonia ?aspiration.  -obtain BC x 2, urine c/s  -agree with cefepime 2 gm IV q12h  -reason for abx use and side effects reviewed with patient; monitor BMP   -old chart reviewed to assess prior cultures  -monitor temps  -f/u CBC  -supportive care  2. Other issues:   -care per medicine 64 y/o Male with h/o multiple sclerosis, h/o recurrent UTIs, hypothyroidism, hiatal hernia, nephrolithiasis, HTN was admitted on 5/8 for cough and fever. Patient developed cough productive of yellow sputum x 3 days and a fever of 101F. In ER he was noted with fever to 102.4F and received cefepime.     1. Febrile syndrome. Possible sepsis. Cough. RLL pneumonia ?aspiration. Pyuria. Possible UTI.   -obtain BC x 2, urine c/s  -agree with cefepime 2 gm IV q12h  -add azithromycin 500 mg PO qd  -reason for abx use and side effects reviewed with patient; monitor BMP   -old chart reviewed to assess prior cultures  -obtain renal US ?urinary bladder dysfunction  -aspiration precautions   -monitor temps  -f/u CBC  -supportive care  2. Other issues:   -care per medicine

## 2019-05-08 NOTE — ED ADULT NURSE NOTE - OBJECTIVE STATEMENT
[facility] reports pt was 88% on RA upon assessment. Pt is febrile with cough & chest congestion since Sunday. Hx MS. Pt wife reports frequent UTI and 2 recently admission at [facility] in the past 2 months. Pt has been taking Macrobid PO since 4/23/19. O2 94% on 2L/NC. Tolerating well. Denies SOB at present

## 2019-05-08 NOTE — H&P ADULT - ASSESSMENT
62 y/o M with PMH of multiple sclerosis, h/o recurrent UTIs, hypothyroidism, nephrolithiasis, HTN, recently admitted to [facility] for UTI (currently on marobid), P/w cough    *Sepsis - possibly 2/2 parainfluenza infection w/ possible superimposed gram negative PNA vs UTI  -C/w vanco / cefepime  -Blood cultures  -Isolation for parainfluenza infection  -IVF  -ID conuslt  -CT chest for further evaluation for PNA   -If UTIs continue to occur -> may need to do CT abdomen to check for complicated infection     *H/o Multiple sclerosis / hypothyroidism / HTN   -C/w home meds and outpatient management if conditions remain stable during hospitalization     *DVT ppx  -SCDs

## 2019-05-08 NOTE — ED PROVIDER NOTE - PMH
Hyperlipidemia    Hypothyroidism    Kidney calculus    Multiple sclerosis  dx 1998 - tysabri iv infusion monthly  Sleep apnea

## 2019-05-08 NOTE — ED ADULT NURSE NOTE - NSIMPLEMENTINTERV_GEN_ALL_ED
Implemented All Fall with Harm Risk Interventions:  [place] to call system. Call bell, personal items and telephone within reach. Instruct patient to call for assistance. Room bathroom lighting operational. Non-slip footwear when patient is off stretcher. Physically safe environment: no spills, clutter or unnecessary equipment. Stretcher in lowest position, wheels locked, appropriate side rails in place. Provide visual cue, wrist band, yellow gown, etc. Monitor gait and stability. Monitor for mental status changes and reorient to person, place, and time. Review medications for side effects contributing to fall risk. Reinforce activity limits and safety measures with patient and family. Provide visual clues: red socks.

## 2019-05-08 NOTE — ED PROVIDER NOTE - OBJECTIVE STATEMENT
64 yo male with h/o MS, HLD, hypothyroidism biba with congestion x 3 days.  Fever today.  Took advil about 1 hour PTA at home.  No chest pain.  +sob.  Pt recently admitted for UTI at [facility], currently on Macrobid 50mg once daily prophylaxis.  PMD Evelin, Uro Greg, KAZ Jacobo

## 2019-05-08 NOTE — ED ADULT TRIAGE NOTE - CHIEF COMPLAINT QUOTE
patient brought in by EMS from home, history of MS wheelchair bound.  as per wife increase weakness and congestion x 3 days.  patient awake and alert denies pain.  wife gave patient Advil and old amoxicillin  at 10pm

## 2019-05-09 LAB
CULTURE RESULTS: NO GROWTH — SIGNIFICANT CHANGE UP
SPECIMEN SOURCE: SIGNIFICANT CHANGE UP

## 2019-05-09 PROCEDURE — 93010 ELECTROCARDIOGRAM REPORT: CPT

## 2019-05-09 PROCEDURE — 99255 IP/OBS CONSLTJ NEW/EST HI 80: CPT

## 2019-05-09 RX ORDER — IPRATROPIUM/ALBUTEROL SULFATE 18-103MCG
3 AEROSOL WITH ADAPTER (GRAM) INHALATION EVERY 6 HOURS
Refills: 0 | Status: DISCONTINUED | OUTPATIENT
Start: 2019-05-09 | End: 2019-05-13

## 2019-05-09 RX ORDER — ALBUTEROL 90 UG/1
2 AEROSOL, METERED ORAL EVERY 6 HOURS
Refills: 0 | Status: DISCONTINUED | OUTPATIENT
Start: 2019-05-09 | End: 2019-05-09

## 2019-05-09 RX ADMIN — Medication 25 MILLIGRAM(S): at 06:22

## 2019-05-09 RX ADMIN — HEPARIN SODIUM 5000 UNIT(S): 5000 INJECTION INTRAVENOUS; SUBCUTANEOUS at 06:21

## 2019-05-09 RX ADMIN — CEFEPIME 100 MILLIGRAM(S): 1 INJECTION, POWDER, FOR SOLUTION INTRAMUSCULAR; INTRAVENOUS at 06:21

## 2019-05-09 RX ADMIN — Medication 40 MILLIGRAM(S): at 17:13

## 2019-05-09 RX ADMIN — MODAFINIL 200 MILLIGRAM(S): 200 TABLET ORAL at 11:28

## 2019-05-09 RX ADMIN — POLYETHYLENE GLYCOL 3350 17 GRAM(S): 17 POWDER, FOR SOLUTION ORAL at 17:14

## 2019-05-09 RX ADMIN — HEPARIN SODIUM 5000 UNIT(S): 5000 INJECTION INTRAVENOUS; SUBCUTANEOUS at 17:13

## 2019-05-09 RX ADMIN — AZITHROMYCIN 500 MILLIGRAM(S): 500 TABLET, FILM COATED ORAL at 11:28

## 2019-05-09 RX ADMIN — Medication 75 MICROGRAM(S): at 06:21

## 2019-05-09 RX ADMIN — Medication 3 MILLILITER(S): at 20:17

## 2019-05-09 RX ADMIN — CEFEPIME 100 MILLIGRAM(S): 1 INJECTION, POWDER, FOR SOLUTION INTRAMUSCULAR; INTRAVENOUS at 17:13

## 2019-05-09 RX ADMIN — Medication 3 MILLILITER(S): at 11:59

## 2019-05-09 RX ADMIN — Medication 81 MILLIGRAM(S): at 11:28

## 2019-05-09 RX ADMIN — Medication 40 MILLIGRAM(S): at 06:28

## 2019-05-09 RX ADMIN — Medication 1200 MILLIGRAM(S): at 17:13

## 2019-05-09 RX ADMIN — Medication 1200 MILLIGRAM(S): at 06:21

## 2019-05-09 NOTE — PROGRESS NOTE ADULT - ASSESSMENT
62 y/o Male with h/o multiple sclerosis, h/o recurrent UTIs, hypothyroidism, hiatal hernia, nephrolithiasis, HTN was admitted on 5/8 for cough and fever. Patient developed cough productive of yellow sputum x 3 days and a fever of 101F. In ER he was noted with fever to 102.4F and received cefepime.     1. Febrile syndrome improving. Possible sepsis. Cough. RLL pneumonia ?aspiration. Pyuria. Possible UTI.   -weak  -f/u BC x 2, urine c/s  -on cefepime 2 gm IV q12h and azithromycin 500 mg PO qd # 2  -tolerating abx well so far; no side effects noted  -continue abx coverage  -aspiration precautions  -monitor temps  -f/u CBC  -supportive care  2. Other issues:   -care per medicine

## 2019-05-09 NOTE — CONSULT NOTE ADULT - ASSESSMENT
RLL PNA. HCAP. ? aspiration.  O2 as needed. IV Abx's as per ID. aspiration precautions. IV steroids, taper as tolerated.  albuterol inhaler 1 p q 6 hours.     MS    Renal stones.  h.o. UTI 1 mo ago. RLL PNA. HCAP. ? aspiration.  O2 as needed. IV Abx's as per ID. aspiration precautions. IV steroids, taper as tolerated.  albuterol inhaler 2 p q 6 hours.     MS    Renal stones.  h.o. UTI 1 mo ago. RLL PNA. HCAP. ? aspiration.  O2 as needed. IV Abx's as per ID. aspiration precautions. IV steroids, taper as tolerated.  duonebs.     h.o. SATHISH.  not on C-PAP treatment.  NC O2 during hs.    MS    Renal stones.  h.o. UTI 1 mo ago.

## 2019-05-09 NOTE — CONSULT NOTE ADULT - SUBJECTIVE AND OBJECTIVE BOX
Patient is a 63y old  Male who presents with a chief complaint of fever     HPI:  62 y/o Male with h/o multiple sclerosis, h/o recurrent UTIs, hypothyroidism, hiatal hernia, nephrolithiasis, HTN was admitted on  for cough and fever. Patient developed cough productive of yellow sputum x 3 days and a fever of 101F. In ER he was noted with fever to 102.4F and received cefepime.     PSH: Tonsillectomy     Social hx: Cuong x 3    Family Hx: Cuong (08 May 2019 03:34)      PMH: as above  PSH: as above  Meds: per reconciliation sheet, noted below  MEDICATIONS  (STANDING):  aspirin enteric coated 81 milliGRAM(s) Oral daily  cefepime  Injectable. 1000 milliGRAM(s) IV Push every 12 hours  levothyroxine 75 MICROGram(s) Oral daily  metoprolol succinate ER 25 milliGRAM(s) Oral daily  modafinil 200 milliGRAM(s) Oral daily  vancomycin  IVPB 1000 milliGRAM(s) IV Intermittent every 12 hours    MEDICATIONS  (PRN):  acetaminophen   Tablet .. 650 milliGRAM(s) Oral every 6 hours PRN Temp greater or equal to 38C (100.4F), Moderate Pain (4 - 6)  ALBUTerol    90 MICROgram(s) HFA Inhaler 1 Puff(s) Inhalation every 6 hours PRN Shortness of Breath  clonazePAM  Tablet 1 milliGRAM(s) Oral daily PRN -  docusate sodium 100 milliGRAM(s) Oral three times a day PRN Constipation    Allergies    No Known Allergies    Intolerances      Social: no smoking, no alcohol, no illegal drugs; no recent travel, no exposure to TB  FAMILY HISTORY:    no history of premature cardiovascular disease in first degree relatives    ROS: the patient denies HA, no seizures, no dizziness, no sore throat, no nasal congestion, no blurry vision, no CP, no palpitations, no SOB, no cough, no abdominal pain, no diarrhea, no N/V, no dysuria, no leg pain, no claudication, no rash, no joint aches, no rectal pain or bleeding, no night sweats  All other systems reviewed and are negative    Vital Signs Last 24 Hrs  T(C): 37.7 (08 May 2019 10:47), Max: 39.1 (08 May 2019 00:26)  T(F): 99.8 (08 May 2019 10:47), Max: 102.4 (08 May 2019 00:26)  HR: 90 (08 May 2019 10:47) (75 - 112)  BP: 147/88 (08 May 2019 10:47) (127/68 - 168/82)  BP(mean): --  RR: 19 (08 May 2019 10:47) (18 - 22)  SpO2: 97% (08 May 2019 10:47) (93% - 97%)  Daily Height in cm: 177.8 (08 May 2019 00:07)    Daily Weight in k.6 (08 May 2019 06:22)    PE:    Constitutional: frail looking  HEENT: NC/AT, EOMI, PERRLA, conjunctivae clear; ears and nose atraumatic; pharynx benign  Neck: supple; thyroid not palpable  Back: no tenderness  Respiratory: respiratory effort normal; clear to auscultation  Cardiovascular: S1S2 regular, no murmurs  Abdomen: soft, not tender, not distended, positive BS; no liver or spleen organomegaly  Genitourinary: no suprapubic tenderness  Lymphatic: no LN palpable  Musculoskeletal: no muscle tenderness, no joint swelling or tenderness  Extremities: no pedal edema  Neurological/ Psychiatric: AxOx3, judgement and insight normal; moving all extremities  Skin: no rashes; no palpable lesions    Labs: all available labs reviewed                        13.0   9.01  )-----------( 161      ( 08 May 2019 07:24 )             39.1     05-08    138  |  106  |  10  ----------------------------<  208<H>  3.5   |  27  |  0.98    Ca    8.2<L>      08 May 2019 07:24  Phos  2.5     05-08  Mg     1.8     05-08    TPro  6.1  /  Alb  2.8<L>  /  TBili  0.5  /  DBili  x   /  AST  24  /  ALT  49  /  AlkPhos  114  05-08     LIVER FUNCTIONS - ( 08 May 2019 07:24 )  Alb: 2.8 g/dL / Pro: 6.1 gm/dL / ALK PHOS: 114 U/L / ALT: 49 U/L / AST: 24 U/L / GGT: x           Urinalysis Basic - ( 08 May 2019 01:08 )    Color: Yellow / Appearance: Clear / S.020 / pH: x  Gluc: x / Ketone: Trace  / Bili: Negative / Urobili: Negative mg/dL   Blood: x / Protein: 30 mg/dL / Nitrite: Positive   Leuk Esterase: Moderate / RBC: 0-2 /HPF / WBC 11-25   Sq Epi: x / Non Sq Epi: Few / Bacteria: Moderate        Radiology: all available radiological tests reviewed    < from: CT Chest No Cont (19 @ 10:09) >  Posterior right lower lobe airspace disease suspect for   pneumonia.    < end of copied text >      Advanced directives addressed: full resuscitation
HPI:  64 y/o M with PMH of multiple sclerosis, h/o recurrent UTIs, hypothyroidism, hiatal hernia, nephrolithiasis, HTN, recently admitted to [facility] for UTI, P/w cough and fever. Patient started to have cough productive of yellow sputum on  and a fever of 101 degrees. Patient was recently hospitalized at [facility] for 3 nights and treated for UTI. Denies nausea, vomiting abdominal pain, CP, SOB, dysuria, increased urinary frequency     ex-cig smoker 10 pk-yrs, quit in . denies past h.o. PNA.      :  feeling somewhat better today. has cough, sputum production. breathing is improving.    PSH: Tonsillectomy     Social hx: Denies x 3    Family Hx: Denies (08 May 2019 03:34)      PAST MEDICAL & SURGICAL HISTORY:  Sleep apnea  Hyperlipidemia  Hypothyroidism  Kidney calculus  Multiple sclerosis: dx  - tysabri iv infusion monthly  H/O lithotripsy:   H/O angioplasty:  chronic cerebrospinal vascular insufficiency - treatment for MS by Dr Trace López  History of tonsillectomy      MEDICATIONS  (STANDING):  aspirin enteric coated 81 milliGRAM(s) Oral daily  azithromycin   Tablet 500 milliGRAM(s) Oral daily  cefepime   IVPB 2000 milliGRAM(s) IV Intermittent every 12 hours  guaiFENesin ER 1200 milliGRAM(s) Oral every 12 hours  heparin  Injectable 5000 Unit(s) SubCutaneous every 12 hours  levothyroxine 75 MICROGram(s) Oral daily  methylPREDNISolone sodium succinate Injectable 40 milliGRAM(s) IV Push every 8 hours  metoprolol succinate ER 25 milliGRAM(s) Oral daily  modafinil 200 milliGRAM(s) Oral daily    MEDICATIONS  (PRN):  acetaminophen   Tablet .. 650 milliGRAM(s) Oral every 6 hours PRN Temp greater or equal to 38C (100.4F), Moderate Pain (4 - 6)  ALBUTerol    90 MICROgram(s) HFA Inhaler 1 Puff(s) Inhalation every 6 hours PRN Shortness of Breath  clonazePAM  Tablet 1 milliGRAM(s) Oral daily PRN -  docusate sodium 100 milliGRAM(s) Oral three times a day PRN Constipation  polyethylene glycol 3350 17 Gram(s) Oral two times a day PRN Constipation      Allergies    No Known Allergies    Intolerances        SOCIAL HISTORY: Denies tobacco, etoh abuse or illicit drug use    FAMILY HISTORY:      Vital Signs Last 24 Hrs  T(C): 36.3 (09 May 2019 05:08), Max: 37.7 (08 May 2019 10:47)  T(F): 97.4 (09 May 2019 05:08), Max: 99.8 (08 May 2019 10:47)  HR: 82 (09 May 2019 05:08) (82 - 109)  BP: 123/74 (09 May 2019 05:08) (123/74 - 152/94)  BP(mean): --  RR: 19 (09 May 2019 05:08) (19 - 20)  SpO2: 94% (09 May 2019 05:08) (94% - 97%)    REVIEW OF SYSTEMS:    CONSTITUTIONAL:  As per HPI.  HEENT:  Eyes:  No diplopia or blurred vision. ENT:  No earache, sore throat or runny nose.  CARDIOVASCULAR:  No pressure, squeezing, tightness, heaviness or aching about the chest, neck, axilla or epigastrium.  RESPIRATORY:  see above.  GASTROINTESTINAL:  No nausea, vomiting or diarrhea.  GENITOURINARY:  No dysuria, frequency or urgency.  MUSCULOSKELETAL:  As per HPI.  SKIN:  No change in skin, hair or nails.  NEUROLOGIC:  No paresthesias, fasciculations, seizures or weakness.  PSYCHIATRIC:  No disorder of thought or mood.  ENDOCRINE:  No heat or cold intolerance, polyuria or polydipsia.  HEMATOLOGICAL:  No easy bruising or bleedings:  .     PHYSICAL EXAMINATION:    GENERAL APPEARANCE:  Pt. is not currently dyspneic, in no distress. Pt. is alert, oriented, and pleasant.  HEENT:  Pupils are normal and react normally. No icterus. Mucous membranes well colored.  NECK:  Supple. No lymphadenopathy. Jugular venous pressure not elevated. Carotids equal.   HEART:   The cardiac impulse has a normal quality. Regular. Normal S1 and S2. There are no murmurs, rubs or gallops noted  CHEST:  Bilat rhonchi, greatest in R more than L base.  ABDOMEN:  Soft and nontender.   EXTREMITIES:  There is no cyanosis, clubbing or edema.   SKIN:  No rash or significant lesions are noted.  Neuro: Alert, awake, and O x 3.      LABS:                        13.0   9.01  )-----------( 161      ( 08 May 2019 07:24 )             39.1     05-08    138  |  106  |  10  ----------------------------<  208<H>  3.5   |  27  |  0.98    Ca    8.2<L>      08 May 2019 07:24  Phos  2.5       Mg     1.8         TPro  6.1  /  Alb  2.8<L>  /  TBili  0.5  /  DBili  x   /  AST  24  /  ALT  49  /  AlkPhos  114      LIVER FUNCTIONS - ( 08 May 2019 07:24 )  Alb: 2.8 g/dL / Pro: 6.1 gm/dL / ALK PHOS: 114 U/L / ALT: 49 U/L / AST: 24 U/L / GGT: x           PT/INR - ( 08 May 2019 00:22 )   PT: 11.6 sec;   INR: 1.04 ratio         PTT - ( 08 May 2019 00:22 )  PTT:32.0 sec      Urinalysis Basic - ( 08 May 2019 01:08 )    Color: Yellow / Appearance: Clear / S.020 / pH: x  Gluc: x / Ketone: Trace  / Bili: Negative / Urobili: Negative mg/dL   Blood: x / Protein: 30 mg/dL / Nitrite: Positive   Leuk Esterase: Moderate / RBC: 0-2 /HPF / WBC 11-25   Sq Epi: x / Non Sq Epi: Few / Bacteria: Moderate          RADIOLOGY & ADDITIONAL STUDIES:       EXAM:  CT CHEST                            PROCEDURE DATE:  2019          INTERPRETATION:  Clinical information: Cough    COMPARISON: None    PROCEDURE:   CT of the Chest was performed without intravenous contrast.  Sagittal and coronal reformats were performed.      FINDINGS:    LOWER NECK: Within normal limits.  AXILLA, MEDIASTINUM AND ANGELA: No lymphadenopathy.  VESSELS: Atherosclerotic arterial calcifications, including the coronary   arteries.  Borderline dilatation of the ascending aorta, measuring 4 cm.  HEART: The heart is normal in size.  No pericardial effusion.  PLEURA: No pleural effusion.  LUNGS AND LARGE AIRWAYS: No pulmonary nodule or mass. Patchy right lower   lobe airspace disease. Mild subsegmental left lower lobe atelectasis.   Patent central airways.   VISUALIZED UPPER ABDOMEN: Severe diffuse steatosis.  BONES: No acute abnormality.  CHEST WALL:  Unremarkable    IMPRESSION: Posterior right lower lobe airspace disease suspect for   pneumonia.

## 2019-05-09 NOTE — PROGRESS NOTE ADULT - SUBJECTIVE AND OBJECTIVE BOX
HOSPITALIST PROGRESS NOTE:  SUBJECTIVE:  PCP: PCP: Evelin  Neuro: Aj Monroy   : Jerome Tatianaeduardojohanny    Chief Complaint: Patient is a 63y old  Male who presents with a chief complaint of fever (08 May 2019 11:40)      HPI:  62 y/o M with PMH of multiple sclerosis, h/o recurrent UTIs, hypothyroidism, hiatal hernia, nephrolithiasis, HTN, recently admitted to [facility] for UTI, P/w cough and fever. Patient started to have cough productive of yellow sputum on  and a fever of 101 degrees. Patient was recently hospitalized at [facility] for 3 nights and treated for UTI. Denies nausea, vomiting abdominal pain, CP, SOB, dysuria, increased urinary frequency     :  Above Reviewed; Patient continues to have cough, wheezing and dyspnea; Not improving   :  Better after starting IV steroids yesterday; No other complaints     Allergies:  No Known Allergies    REVIEW OF SYSTEMS:  See HPI. All other review of systems is negative unless indicated above.     OBJECTIVE  Physical Exam:  Vital Signs Last 24 Hrs  T(C): 37.2 (09 May 2019 11:32), Max: 37.6 (08 May 2019 17:25)  T(F): 98.9 (09 May 2019 11:32), Max: 99.7 (08 May 2019 17:25)  HR: 96 (09 May 2019 12:00) (82 - 109)  BP: 131/78 (09 May 2019 11:32) (123/74 - 152/94)  BP(mean): --  RR: 20 (09 May 2019 11:32) (19 - 20)  SpO2: 93% (09 May 2019 11:32) (93% - 94%)    Constitutional: NAD, awake and alert, well-developed  Neurological: AAO x 3, no focal deficits; LE weakness 2ndry to MS  HEENT: PERRLA, EOMI, MMM  Neck: Soft and supple, No LAD, No JVD  Respiratory: Breath sounds are clear bilaterally, + wheezing and rhonchi B/L  Cardiovascular: S1 and S2, regular rate and rhythm; no Murmurs, gallops or rubs  Gastrointestinal: Bowel Sounds present, soft, nontender, nondistended, no guarding, no rebound tenderness  Back: No CVA tenderness   Extremities: No peripheral edema  Vascular: 2+ peripheral pulses  Musculoskeletal: LE weakness B/L 2ndry to MS  Skin: No rashes  Breast: Deferred  Rectal: Deferred    MEDICATIONS  (STANDING):  aspirin enteric coated 81 milliGRAM(s) Oral daily  azithromycin   Tablet 500 milliGRAM(s) Oral daily  cefepime   IVPB 2000 milliGRAM(s) IV Intermittent every 12 hours  levothyroxine 75 MICROGram(s) Oral daily  metoprolol succinate ER 25 milliGRAM(s) Oral daily  modafinil 200 milliGRAM(s) Oral daily    Lab Results:  CBC  CBC Full  -  ( 08 May 2019 07:24 )  WBC Count : 9.01 K/uL  RBC Count : 4.15 M/uL  Hemoglobin : 13.0 g/dL  Hematocrit : 39.1 %  Platelet Count - Automated : 161 K/uL  Mean Cell Volume : 94.2 fl  Mean Cell Hemoglobin : 31.3 pg  Mean Cell Hemoglobin Concentration : 33.2 gm/dL  Auto Neutrophil # : 5.99 K/uL  Auto Lymphocyte # : 1.91 K/uL  Auto Monocyte # : 0.99 K/uL  Auto Eosinophil # : 0.05 K/uL  Auto Basophil # : 0.02 K/uL  Auto Neutrophil % : 66.4 %  Auto Lymphocyte % : 21.2 %  Auto Monocyte % : 11.0 %  Auto Eosinophil % : 0.6 %  Auto Basophil % : 0.2 %    .		Differential:	[] Automated		[] Manual  Chemistry                        13.0   9.01  )-----------( 161      ( 08 May 2019 07:24 )             39.1     05-08    138  |  106  |  10  ----------------------------<  208<H>  3.5   |  27  |  0.98    Ca    8.2<L>      08 May 2019 07:24  Phos  2.5     05-08  Mg     1.8     05-08    TPro  6.1  /  Alb  2.8<L>  /  TBili  0.5  /  DBili  x   /  AST  24  /  ALT  49  /  AlkPhos  114  05-08    LIVER FUNCTIONS - ( 08 May 2019 07:24 )  Alb: 2.8 g/dL / Pro: 6.1 gm/dL / ALK PHOS: 114 U/L / ALT: 49 U/L / AST: 24 U/L / GGT: x           PT/INR - ( 08 May 2019 00:22 )   PT: 11.6 sec;   INR: 1.04 ratio         PTT - ( 08 May 2019 00:22 )  PTT:32.0 sec    Urinalysis Basic - ( 08 May 2019 01:08 )    Color: Yellow / Appearance: Clear / S.020 / pH: x  Gluc: x / Ketone: Trace  / Bili: Negative / Urobili: Negative mg/dL   Blood: x / Protein: 30 mg/dL / Nitrite: Positive   Leuk Esterase: Moderate / RBC: 0-2 /HPF / WBC 11-25   Sq Epi: x / Non Sq Epi: Few / Bacteria: Moderate    MICROBIOLOGY/CULTURES:  Culture Results:   No growth to date. ( @ 00:22)  Culture Results:   No growth to date. ( @ 00:22)    RADIOLOGY/EKG:    < from: Xray Chest 1 View-PORTABLE IMMEDIATE (19 @ 00:49) >    Impression:    No acute disease    < end of copied text >    < from: CT Chest No Cont (19 @ 10:09) >    IMPRESSION: Posterior right lower lobe airspace disease suspect for   pneumonia.    < end of copied text >    < from: CT Chest No Cont (19 @ 10:09) >    IMPRESSION: Posterior right lower lobe airspace disease suspect for   pneumonia.    < end of copied text >

## 2019-05-09 NOTE — PROGRESS NOTE ADULT - SUBJECTIVE AND OBJECTIVE BOX
Date of service: 19 @ 09:51    Fever is down  Has cough with scant sputum  Had BM this AM  Has SOB    ROS: no fever or chills; denies dizziness, no HA, no abdominal pain, no diarrhea or constipation; no dysuria, no legs pain, no rashes    MEDICATIONS  (STANDING):  aspirin enteric coated 81 milliGRAM(s) Oral daily  azithromycin   Tablet 500 milliGRAM(s) Oral daily  cefepime   IVPB 2000 milliGRAM(s) IV Intermittent every 12 hours  guaiFENesin ER 1200 milliGRAM(s) Oral every 12 hours  heparin  Injectable 5000 Unit(s) SubCutaneous every 12 hours  levothyroxine 75 MICROGram(s) Oral daily  methylPREDNISolone sodium succinate Injectable 40 milliGRAM(s) IV Push every 8 hours  metoprolol succinate ER 25 milliGRAM(s) Oral daily  modafinil 200 milliGRAM(s) Oral daily      Vital Signs Last 24 Hrs  T(C): 36.3 (09 May 2019 05:08), Max: 37.7 (08 May 2019 10:47)  T(F): 97.4 (09 May 2019 05:08), Max: 99.8 (08 May 2019 10:47)  HR: 82 (09 May 2019 05:08) (82 - 109)  BP: 123/74 (09 May 2019 05:08) (123/74 - 152/94)  BP(mean): --  RR: 19 (09 May 2019 05:08) (19 - 20)  SpO2: 94% (09 May 2019 05:08) (94% - 97%)    Physical Exam:      Constitutional: frail looking  HEENT: NC/AT, EOMI, PERRLA, conjunctivae clear  Neck: supple; thyroid not palpable  Back: no tenderness  Respiratory: respiratory effort normal; crackles at bases  Cardiovascular: S1S2 regular, no murmurs  Abdomen: soft, not tender, not distended, positive BS  Genitourinary: no suprapubic tenderness  Lymphatic: no LN palpable  Musculoskeletal: no muscle tenderness, no joint swelling or tenderness  Extremities: no pedal edema  Neurological/ Psychiatric: AxOx3, moving all extremities  Skin: no rashes; no palpable lesions    Labs: reviewed                        13.0   9.01  )-----------( 161      ( 08 May 2019 07:24 )             39.1     05-08    138  |  106  |  10  ----------------------------<  208<H>  3.5   |  27  |  0.98    Ca    8.2<L>      08 May 2019 07:24  Phos  2.5     05-  Mg     1.8         TPro  6.1  /  Alb  2.8<L>  /  TBili  0.5  /  DBili  x   /  AST  24  /  ALT  49  /  AlkPhos  114              Urinalysis Basic - ( 08 May 2019 01:08 )    Color: Yellow / Appearance: Clear / S.020 / pH: x  Gluc: x / Ketone: Trace  / Bili: Negative / Urobili: Negative mg/dL   Blood: x / Protein: 30 mg/dL / Nitrite: Positive   Leuk Esterase: Moderate / RBC: 0-2 /HPF / WBC 11-25   Sq Epi: x / Non Sq Epi: Few / Bacteria: Moderate    Rapid Respiratory Viral Panel (19 @ 00:40)    Rapid RVP Result: Detected:     Parainfluenza 1 (RapRVP): Detected:         Radiology: all available radiological tests reviewed    < from: CT Chest No Cont (19 @ 10:09) >  Posterior right lower lobe airspace disease suspect for   pneumonia.    < end of copied text >      Advanced directives addressed: full resuscitation

## 2019-05-09 NOTE — PROGRESS NOTE ADULT - ASSESSMENT
62 y/o M with PMH of multiple sclerosis, h/o recurrent UTIs, hypothyroidism, nephrolithiasis, HTN, recently admitted to [facility] for UTI (currently on marobid), P/w cough    *Sepsis - possibly 2/2 GNR PNA with superimposed parainfluenza infection and UTI  -CT chest RLL PNA  -C/w azithro and cefepime #2   -taper solumedrol Q12H  -Mucinex  -Nebs ATC  -aspiration precautions  -Isolation for parainfluenza infection   -IVF  -ID consult appreciated   -Pulm consult appreciated     *SATHISH  -not on C-PAP treatment.  NC O2 during hs.    *H/o Multiple sclerosis / hypothyroidism / HTN   -C/w home meds and outpatient management if conditions remain stable during hospitalization     *DVT ppx  -Heparin SQ

## 2019-05-10 RX ORDER — LACTOBACILLUS ACIDOPHILUS 100MM CELL
1 CAPSULE ORAL
Refills: 0 | Status: DISCONTINUED | OUTPATIENT
Start: 2019-05-10 | End: 2019-05-13

## 2019-05-10 RX ADMIN — Medication 1200 MILLIGRAM(S): at 06:32

## 2019-05-10 RX ADMIN — HEPARIN SODIUM 5000 UNIT(S): 5000 INJECTION INTRAVENOUS; SUBCUTANEOUS at 17:08

## 2019-05-10 RX ADMIN — Medication 1 TABLET(S): at 17:08

## 2019-05-10 RX ADMIN — Medication 40 MILLIGRAM(S): at 06:32

## 2019-05-10 RX ADMIN — Medication 1 MILLIGRAM(S): at 22:59

## 2019-05-10 RX ADMIN — Medication 3 MILLILITER(S): at 08:02

## 2019-05-10 RX ADMIN — AZITHROMYCIN 500 MILLIGRAM(S): 500 TABLET, FILM COATED ORAL at 11:32

## 2019-05-10 RX ADMIN — HEPARIN SODIUM 5000 UNIT(S): 5000 INJECTION INTRAVENOUS; SUBCUTANEOUS at 06:33

## 2019-05-10 RX ADMIN — Medication 75 MICROGRAM(S): at 06:32

## 2019-05-10 RX ADMIN — CEFEPIME 100 MILLIGRAM(S): 1 INJECTION, POWDER, FOR SOLUTION INTRAMUSCULAR; INTRAVENOUS at 17:08

## 2019-05-10 RX ADMIN — Medication 3 MILLILITER(S): at 13:56

## 2019-05-10 RX ADMIN — Medication 3 MILLILITER(S): at 01:55

## 2019-05-10 RX ADMIN — Medication 1200 MILLIGRAM(S): at 17:08

## 2019-05-10 RX ADMIN — Medication 40 MILLIGRAM(S): at 17:08

## 2019-05-10 RX ADMIN — MODAFINIL 200 MILLIGRAM(S): 200 TABLET ORAL at 11:32

## 2019-05-10 RX ADMIN — Medication 3 MILLILITER(S): at 20:17

## 2019-05-10 RX ADMIN — CEFEPIME 100 MILLIGRAM(S): 1 INJECTION, POWDER, FOR SOLUTION INTRAMUSCULAR; INTRAVENOUS at 06:33

## 2019-05-10 RX ADMIN — Medication 25 MILLIGRAM(S): at 06:32

## 2019-05-10 RX ADMIN — Medication 81 MILLIGRAM(S): at 11:32

## 2019-05-10 NOTE — PROGRESS NOTE ADULT - SUBJECTIVE AND OBJECTIVE BOX
HOSPITALIST PROGRESS NOTE:  SUBJECTIVE:  PCP: PCP: Evelin  Neuro: Aj Monroy   : Jerome Tatianaeduardojohanny    Chief Complaint: Patient is a 63y old  Male who presents with a chief complaint of fever (08 May 2019 11:40)      HPI:  64 y/o M with PMH of multiple sclerosis, h/o recurrent UTIs, hypothyroidism, hiatal hernia, nephrolithiasis, HTN, recently admitted to [facility] for UTI, P/w cough and fever. Patient started to have cough productive of yellow sputum on  and a fever of 101 degrees. Patient was recently hospitalized at [facility] for 3 nights and treated for UTI. Denies nausea, vomiting abdominal pain, CP, SOB, dysuria, increased urinary frequency     :  Above Reviewed; Patient continues to have cough, wheezing and dyspnea; Not improving   :  Better after starting IV steroids yesterday; No other complaints   5/10: No overnight events; Patient still congested +Wheezing and cough    Allergies:  No Known Allergies    REVIEW OF SYSTEMS:  See HPI. All other review of systems is negative unless indicated above.     OBJECTIVE  Physical Exam:  Vital Signs Last 24 Hrs  T(C): 36.9 (10 May 2019 10:36), Max: 36.9 (09 May 2019 17:05)  T(F): 98.4 (10 May 2019 10:36), Max: 98.4 (09 May 2019 17:05)  HR: 82 (10 May 2019 13:55) (75 - 90)  BP: 128/77 (10 May 2019 10:36) (112/61 - 128/77)  BP(mean): --  RR: 20 (10 May 2019 10:36) (19 - 20)  SpO2: 97% (10 May 2019 10:36) (94% - 98%)    Constitutional: NAD, awake and alert, well-developed  Neurological: AAO x 3, no focal deficits; LE weakness 2ndry to MS  HEENT: PERRLA, EOMI, MMM  Neck: Soft and supple, No LAD, No JVD  Respiratory: Breath sounds are clear bilaterally, + wheezing and rhonchi B/L  Cardiovascular: S1 and S2, regular rate and rhythm; no Murmurs, gallops or rubs  Gastrointestinal: Bowel Sounds present, soft, nontender, nondistended, no guarding, no rebound tenderness  Back: No CVA tenderness   Extremities: No peripheral edema  Vascular: 2+ peripheral pulses  Musculoskeletal: LE weakness B/L 2ndry to MS  Skin: No rashes  Breast: Deferred  Rectal: Deferred    MEDICATIONS  (STANDING):  aspirin enteric coated 81 milliGRAM(s) Oral daily  azithromycin   Tablet 500 milliGRAM(s) Oral daily  cefepime   IVPB 2000 milliGRAM(s) IV Intermittent every 12 hours  levothyroxine 75 MICROGram(s) Oral daily  metoprolol succinate ER 25 milliGRAM(s) Oral daily  modafinil 200 milliGRAM(s) Oral daily    Lab Results:  CBC  CBC Full  -  ( 08 May 2019 07:24 )  WBC Count : 9.01 K/uL  RBC Count : 4.15 M/uL  Hemoglobin : 13.0 g/dL  Hematocrit : 39.1 %  Platelet Count - Automated : 161 K/uL  Mean Cell Volume : 94.2 fl  Mean Cell Hemoglobin : 31.3 pg  Mean Cell Hemoglobin Concentration : 33.2 gm/dL  Auto Neutrophil # : 5.99 K/uL  Auto Lymphocyte # : 1.91 K/uL  Auto Monocyte # : 0.99 K/uL  Auto Eosinophil # : 0.05 K/uL  Auto Basophil # : 0.02 K/uL  Auto Neutrophil % : 66.4 %  Auto Lymphocyte % : 21.2 %  Auto Monocyte % : 11.0 %  Auto Eosinophil % : 0.6 %  Auto Basophil % : 0.2 %    .		Differential:	[] Automated		[] Manual  Chemistry                        13.0   9.01  )-----------( 161      ( 08 May 2019 07:24 )             39.1     05-08    138  |  106  |  10  ----------------------------<  208<H>  3.5   |  27  |  0.98    Ca    8.2<L>      08 May 2019 07:24  Phos  2.5     05-08  Mg     1.8     05-08    TPro  6.1  /  Alb  2.8<L>  /  TBili  0.5  /  DBili  x   /  AST  24  /  ALT  49  /  AlkPhos  114  05-08    LIVER FUNCTIONS - ( 08 May 2019 07:24 )  Alb: 2.8 g/dL / Pro: 6.1 gm/dL / ALK PHOS: 114 U/L / ALT: 49 U/L / AST: 24 U/L / GGT: x           PT/INR - ( 08 May 2019 00:22 )   PT: 11.6 sec;   INR: 1.04 ratio         PTT - ( 08 May 2019 00:22 )  PTT:32.0 sec    Urinalysis Basic - ( 08 May 2019 01:08 )    Color: Yellow / Appearance: Clear / S.020 / pH: x  Gluc: x / Ketone: Trace  / Bili: Negative / Urobili: Negative mg/dL   Blood: x / Protein: 30 mg/dL / Nitrite: Positive   Leuk Esterase: Moderate / RBC: 0-2 /HPF / WBC 11-25   Sq Epi: x / Non Sq Epi: Few / Bacteria: Moderate    MICROBIOLOGY/CULTURES:  Culture Results:   No growth to date. ( @ 00:22)  Culture Results:   No growth to date. ( @ 00:22)    RADIOLOGY/EKG:    < from: Xray Chest 1 View-PORTABLE IMMEDIATE (19 @ 00:49) >    Impression:    No acute disease    < end of copied text >    < from: CT Chest No Cont (19 @ 10:09) >    IMPRESSION: Posterior right lower lobe airspace disease suspect for   pneumonia.    < end of copied text >    < from: CT Chest No Cont (19 @ 10:09) >    IMPRESSION: Posterior right lower lobe airspace disease suspect for   pneumonia.    < end of copied text >

## 2019-05-10 NOTE — PROGRESS NOTE ADULT - SUBJECTIVE AND OBJECTIVE BOX
Date of service: 05-10-19 @ 10:18    Lying in bed in NAD  Has cough with scant sputum production  Mild SOB  Fever is down    ROS: no fever or chills; denies dizziness, no HA, no abdominal pain, no diarrhea or constipation; no dysuria, no legs pain, no rashes    MEDICATIONS  (STANDING):  ALBUTerol/ipratropium for Nebulization 3 milliLiter(s) Nebulizer every 6 hours  aspirin enteric coated 81 milliGRAM(s) Oral daily  azithromycin   Tablet 500 milliGRAM(s) Oral daily  cefepime   IVPB 2000 milliGRAM(s) IV Intermittent every 12 hours  guaiFENesin ER 1200 milliGRAM(s) Oral every 12 hours  heparin  Injectable 5000 Unit(s) SubCutaneous every 12 hours  levothyroxine 75 MICROGram(s) Oral daily  methylPREDNISolone sodium succinate Injectable 40 milliGRAM(s) IV Push every 12 hours  metoprolol succinate ER 25 milliGRAM(s) Oral daily  modafinil 200 milliGRAM(s) Oral daily      Vital Signs Last 24 Hrs  T(C): 36.9 (10 May 2019 05:21), Max: 37.2 (09 May 2019 11:32)  T(F): 98.4 (10 May 2019 05:21), Max: 98.9 (09 May 2019 11:32)  HR: 75 (10 May 2019 05:21) (75 - 96)  BP: 112/61 (10 May 2019 05:21) (112/61 - 131/78)  BP(mean): --  RR: 19 (10 May 2019 05:21) (19 - 20)  SpO2: 98% (10 May 2019 05:21) (93% - 98%)    Physical Exam:      Constitutional: frail looking  HEENT: NC/AT, EOMI, PERRLA, conjunctivae clear  Neck: supple; thyroid not palpable  Back: no tenderness  Respiratory: respiratory effort normal; crackles at bases  Cardiovascular: S1S2 regular, no murmurs  Abdomen: soft, not tender, not distended, positive BS  Genitourinary: no suprapubic tenderness  Lymphatic: no LN palpable  Musculoskeletal: no muscle tenderness, no joint swelling or tenderness  Extremities: no pedal edema  Neurological/ Psychiatric: AxOx3, moving all extremities  Skin: no rashes; no palpable lesions    Labs: reviewed                        13.0   9.01  )-----------( 161      ( 08 May 2019 07:24 )             39.1     -08    138  |  106  |  10  ----------------------------<  208<H>  3.5   |  27  |  0.98    Ca    8.2<L>      08 May 2019 07:24  Phos  2.5     -  Mg     1.8     08    TPro  6.1  /  Alb  2.8<L>  /  TBili  0.5  /  DBili  x   /  AST  24  /  ALT  49  /  AlkPhos  114  05-08            Urinalysis Basic - ( 08 May 2019 01:08 )    Color: Yellow / Appearance: Clear / S.020 / pH: x  Gluc: x / Ketone: Trace  / Bili: Negative / Urobili: Negative mg/dL   Blood: x / Protein: 30 mg/dL / Nitrite: Positive   Leuk Esterase: Moderate / RBC: 0-2 /HPF / WBC 11-25   Sq Epi: x / Non Sq Epi: Few / Bacteria: Moderate    Rapid Respiratory Viral Panel (19 @ 00:40)    Rapid RVP Result: Detected:     Parainfluenza 1 (RapRVP): Detected:       Culture - Urine (collected 08 May 2019 01:08)  Source: .Urine None  Final Report (09 May 2019 15:19):    No growth    Culture - Blood (collected 08 May 2019 00:22)  Source: .Blood Blood-Peripheral  Preliminary Report (09 May 2019 11:01):    No growth to date.    Culture - Blood (collected 08 May 2019 00:22)  Source: .Blood Blood-Peripheral  Preliminary Report (09 May 2019 11:01):    No growth to date.    Radiology: all available radiological tests reviewed    < from: CT Chest No Cont (19 @ 10:09) >  Posterior right lower lobe airspace disease suspect for   pneumonia.    < end of copied text >      Advanced directives addressed: full resuscitation Implemented All Fall Risk Interventions:  [place] to call system. Call bell, personal items and telephone within reach. Instruct patient to call for assistance. Room bathroom lighting operational. Non-slip footwear when patient is off stretcher. Physically safe environment: no spills, clutter or unnecessary equipment. Stretcher in lowest position, wheels locked, appropriate side rails in place. Provide visual cue, wrist band, yellow gown, etc. Monitor gait and stability. Monitor for mental status changes and reorient to person, place, and time. Review medications for side effects contributing to fall risk. Reinforce activity limits and safety measures with patient and family.

## 2019-05-10 NOTE — PROGRESS NOTE ADULT - ASSESSMENT
62 y/o M with PMH of multiple sclerosis, h/o recurrent UTIs, hypothyroidism, nephrolithiasis, HTN, recently admitted to [facility] for UTI (currently on marobid), P/w cough    *Sepsis - possibly 2/2 GNR PNA with superimposed parainfluenza infection and UTI  -CT chest RLL PNA  -C/w azithro and cefepime #3  -taper solumedrol Q12H  -Mucinex  -Nebs ATC  -aspiration precautions  -Isolation for parainfluenza infection   -IVF  -ID consult appreciated   -Pulm consult appreciated     *SATHISH  -not on C-PAP treatment.  NC O2 during hs.    *H/o Multiple sclerosis / hypothyroidism / HTN   -C/w home meds and outpatient management if conditions remain stable during hospitalization     *DVT ppx  -Heparin SQ

## 2019-05-10 NOTE — PROGRESS NOTE ADULT - ASSESSMENT
64 y/o Male with h/o multiple sclerosis, h/o recurrent UTIs, hypothyroidism, hiatal hernia, nephrolithiasis, HTN was admitted on 5/8 for cough and fever. Patient developed cough productive of yellow sputum x 3 days and a fever of 101F. In ER he was noted with fever to 102.4F and received cefepime.     1. Febrile syndrome resolving. Possible sepsis resolving. Cough. RLL pneumonia ?aspiration.   -weak; still cough  -f/u BC x 2, urine c/s  -on cefepime 2 gm IV q12h and azithromycin 500 mg PO qd # 3  -tolerating abx well so far; no side effects noted  -continue abx coverage  -aspiration precautions  -monitor temps  -f/u CBC  -supportive care  2. Other issues:   -care per medicine

## 2019-05-11 LAB
ANION GAP SERPL CALC-SCNC: 5 MMOL/L — SIGNIFICANT CHANGE UP (ref 5–17)
BUN SERPL-MCNC: 19 MG/DL — SIGNIFICANT CHANGE UP (ref 7–23)
CALCIUM SERPL-MCNC: 8.4 MG/DL — LOW (ref 8.5–10.1)
CHLORIDE SERPL-SCNC: 106 MMOL/L — SIGNIFICANT CHANGE UP (ref 96–108)
CO2 SERPL-SCNC: 27 MMOL/L — SIGNIFICANT CHANGE UP (ref 22–31)
CREAT SERPL-MCNC: 0.84 MG/DL — SIGNIFICANT CHANGE UP (ref 0.5–1.3)
GLUCOSE SERPL-MCNC: 171 MG/DL — HIGH (ref 70–99)
HCT VFR BLD CALC: 38.4 % — LOW (ref 39–50)
HGB BLD-MCNC: 13.2 G/DL — SIGNIFICANT CHANGE UP (ref 13–17)
MAGNESIUM SERPL-MCNC: 2.4 MG/DL — SIGNIFICANT CHANGE UP (ref 1.6–2.6)
MCHC RBC-ENTMCNC: 31.7 PG — SIGNIFICANT CHANGE UP (ref 27–34)
MCHC RBC-ENTMCNC: 34.4 GM/DL — SIGNIFICANT CHANGE UP (ref 32–36)
MCV RBC AUTO: 92.1 FL — SIGNIFICANT CHANGE UP (ref 80–100)
NRBC # BLD: 0 /100 WBCS — SIGNIFICANT CHANGE UP (ref 0–0)
PHOSPHATE SERPL-MCNC: 3.2 MG/DL — SIGNIFICANT CHANGE UP (ref 2.5–4.5)
PLATELET # BLD AUTO: 236 K/UL — SIGNIFICANT CHANGE UP (ref 150–400)
POTASSIUM SERPL-MCNC: 3.9 MMOL/L — SIGNIFICANT CHANGE UP (ref 3.5–5.3)
POTASSIUM SERPL-SCNC: 3.9 MMOL/L — SIGNIFICANT CHANGE UP (ref 3.5–5.3)
RBC # BLD: 4.17 M/UL — LOW (ref 4.2–5.8)
RBC # FLD: 12.2 % — SIGNIFICANT CHANGE UP (ref 10.3–14.5)
SODIUM SERPL-SCNC: 138 MMOL/L — SIGNIFICANT CHANGE UP (ref 135–145)
WBC # BLD: 11.63 K/UL — HIGH (ref 3.8–10.5)
WBC # FLD AUTO: 11.63 K/UL — HIGH (ref 3.8–10.5)

## 2019-05-11 PROCEDURE — 99233 SBSQ HOSP IP/OBS HIGH 50: CPT

## 2019-05-11 RX ORDER — MONTELUKAST 4 MG/1
10 TABLET, CHEWABLE ORAL AT BEDTIME
Refills: 0 | Status: DISCONTINUED | OUTPATIENT
Start: 2019-05-11 | End: 2019-05-13

## 2019-05-11 RX ORDER — BUDESONIDE, MICRONIZED 100 %
0.5 POWDER (GRAM) MISCELLANEOUS EVERY 12 HOURS
Refills: 0 | Status: DISCONTINUED | OUTPATIENT
Start: 2019-05-11 | End: 2019-05-13

## 2019-05-11 RX ORDER — SODIUM CHLORIDE 0.65 %
1 AEROSOL, SPRAY (ML) NASAL EVERY 4 HOURS
Refills: 0 | Status: DISCONTINUED | OUTPATIENT
Start: 2019-05-11 | End: 2019-05-13

## 2019-05-11 RX ADMIN — Medication 1200 MILLIGRAM(S): at 17:04

## 2019-05-11 RX ADMIN — MODAFINIL 200 MILLIGRAM(S): 200 TABLET ORAL at 12:13

## 2019-05-11 RX ADMIN — Medication 3 MILLILITER(S): at 08:10

## 2019-05-11 RX ADMIN — AZITHROMYCIN 500 MILLIGRAM(S): 500 TABLET, FILM COATED ORAL at 12:12

## 2019-05-11 RX ADMIN — Medication 1200 MILLIGRAM(S): at 05:16

## 2019-05-11 RX ADMIN — Medication 81 MILLIGRAM(S): at 12:13

## 2019-05-11 RX ADMIN — Medication 25 MILLIGRAM(S): at 05:16

## 2019-05-11 RX ADMIN — Medication 40 MILLIGRAM(S): at 17:04

## 2019-05-11 RX ADMIN — Medication 1 TABLET(S): at 05:15

## 2019-05-11 RX ADMIN — Medication 1 TABLET(S): at 17:04

## 2019-05-11 RX ADMIN — HEPARIN SODIUM 5000 UNIT(S): 5000 INJECTION INTRAVENOUS; SUBCUTANEOUS at 05:15

## 2019-05-11 RX ADMIN — Medication 3 MILLILITER(S): at 13:35

## 2019-05-11 RX ADMIN — CEFEPIME 100 MILLIGRAM(S): 1 INJECTION, POWDER, FOR SOLUTION INTRAMUSCULAR; INTRAVENOUS at 05:14

## 2019-05-11 RX ADMIN — Medication 3 MILLILITER(S): at 20:18

## 2019-05-11 RX ADMIN — Medication 40 MILLIGRAM(S): at 05:15

## 2019-05-11 RX ADMIN — Medication 3 MILLILITER(S): at 02:06

## 2019-05-11 RX ADMIN — HEPARIN SODIUM 5000 UNIT(S): 5000 INJECTION INTRAVENOUS; SUBCUTANEOUS at 17:03

## 2019-05-11 RX ADMIN — Medication 1 MILLIGRAM(S): at 23:19

## 2019-05-11 RX ADMIN — MONTELUKAST 10 MILLIGRAM(S): 4 TABLET, CHEWABLE ORAL at 20:56

## 2019-05-11 RX ADMIN — POLYETHYLENE GLYCOL 3350 17 GRAM(S): 17 POWDER, FOR SOLUTION ORAL at 12:16

## 2019-05-11 RX ADMIN — Medication 75 MICROGRAM(S): at 05:16

## 2019-05-11 RX ADMIN — CEFEPIME 100 MILLIGRAM(S): 1 INJECTION, POWDER, FOR SOLUTION INTRAMUSCULAR; INTRAVENOUS at 17:03

## 2019-05-11 RX ADMIN — Medication 0.5 MILLIGRAM(S): at 20:18

## 2019-05-11 NOTE — PROGRESS NOTE ADULT - ASSESSMENT
RLL PNA. HCAP. ? aspiration.  O2 as needed. IV Abx's as per ID. aspiration precautions.     Positive para flu. asthmatic bronchitis. COPD.  IV steroids, nebs, will add budesonide nebs and montelukast.     h.o. SATHISH.  not on C-PAP treatment.  NC O2 during hs.    MS    Renal stones.  h.o. UTI 1 mo ago.

## 2019-05-11 NOTE — PROGRESS NOTE ADULT - ASSESSMENT
64 y/o M with PMH of multiple sclerosis, h/o recurrent UTIs, hypothyroidism, nephrolithiasis, HTN, recently admitted to [facility] for UTI (currently on marobid), P/w cough    *Sepsis - possibly 2/2 GNR PNA with superimposed parainfluenza infection and UTI  -CT chest RLL PNA  -continue azithro and cefepime #4  -solumedrol Q12H  -Mucinex  -Nebs ATC  -aspiration precautions  -Isolation for parainfluenza infection   -IVF  -ID consult appreciated   -Pulm consult appreciated     *SATHISH  -not on C-PAP treatment.  NC O2 during hs.    *H/o Multiple sclerosis / hypothyroidism / HTN   -C/w home meds and outpatient management if conditions remain stable during hospitalization     *DVT ppx  -Heparin SQ

## 2019-05-11 NOTE — PROGRESS NOTE ADULT - SUBJECTIVE AND OBJECTIVE BOX
HPI:  64 y/o M with PMH of multiple sclerosis, h/o recurrent UTIs, hypothyroidism, hiatal hernia, nephrolithiasis, HTN, recently admitted to [facility] for UTI, P/w cough and fever. Patient started to have cough productive of yellow sputum on  and a fever of 101 degrees. Patient was recently hospitalized at [facility] for 3 nights and treated for UTI. Denies nausea, vomiting abdominal pain, CP, SOB, dysuria, increased urinary frequency     ex-cig smoker 10 pk-yrs, quit in . denies past h.o. PNA.      :  feeling somewhat better today. has cough, sputum production. breathing is improving.    : no distress. has cough.     PSH: Tonsillectomy     Social hx: Denies x 3    Family Hx: Denies (08 May 2019 03:34)      PAST MEDICAL & SURGICAL HISTORY:  Sleep apnea  Hyperlipidemia  Hypothyroidism  Kidney calculus  Multiple sclerosis: dx  - tysabri iv infusion monthly  H/O lithotripsy:   H/O angioplasty:  chronic cerebrospinal vascular insufficiency - treatment for MS by Dr Trace López  History of tonsillectomy      MEDICATIONS  (STANDING):  aspirin enteric coated 81 milliGRAM(s) Oral daily  azithromycin   Tablet 500 milliGRAM(s) Oral daily  cefepime   IVPB 2000 milliGRAM(s) IV Intermittent every 12 hours  guaiFENesin ER 1200 milliGRAM(s) Oral every 12 hours  heparin  Injectable 5000 Unit(s) SubCutaneous every 12 hours  levothyroxine 75 MICROGram(s) Oral daily  methylPREDNISolone sodium succinate Injectable 40 milliGRAM(s) IV Push every 8 hours  metoprolol succinate ER 25 milliGRAM(s) Oral daily  modafinil 200 milliGRAM(s) Oral daily    MEDICATIONS  (PRN):  acetaminophen   Tablet .. 650 milliGRAM(s) Oral every 6 hours PRN Temp greater or equal to 38C (100.4F), Moderate Pain (4 - 6)  ALBUTerol    90 MICROgram(s) HFA Inhaler 1 Puff(s) Inhalation every 6 hours PRN Shortness of Breath  clonazePAM  Tablet 1 milliGRAM(s) Oral daily PRN -  docusate sodium 100 milliGRAM(s) Oral three times a day PRN Constipation  polyethylene glycol 3350 17 Gram(s) Oral two times a day PRN Constipation      Allergies    No Known Allergies    Intolerances        SOCIAL HISTORY: Denies tobacco, etoh abuse or illicit drug use    FAMILY HISTORY:      Vital Signs Last 24 Hrs  T(C): 36.3 (09 May 2019 05:08), Max: 37.7 (08 May 2019 10:47)  T(F): 97.4 (09 May 2019 05:08), Max: 99.8 (08 May 2019 10:47)  HR: 82 (09 May 2019 05:08) (82 - 109)  BP: 123/74 (09 May 2019 05:08) (123/74 - 152/94)  BP(mean): --  RR: 19 (09 May 2019 05:08) (19 - 20)  SpO2: 94% (09 May 2019 05:08) (94% - 97%)    REVIEW OF SYSTEMS:    CONSTITUTIONAL:  As per HPI.  HEENT:  Eyes:  No diplopia or blurred vision. ENT:  No earache, sore throat or runny nose.  CARDIOVASCULAR:  No pressure, squeezing, tightness, heaviness or aching about the chest, neck, axilla or epigastrium.  RESPIRATORY:  see above.  GASTROINTESTINAL:  No nausea, vomiting or diarrhea.  GENITOURINARY:  No dysuria, frequency or urgency.  MUSCULOSKELETAL:  As per HPI.  SKIN:  No change in skin, hair or nails.  NEUROLOGIC:  No paresthesias, fasciculations, seizures or weakness.  PSYCHIATRIC:  No disorder of thought or mood.  ENDOCRINE:  No heat or cold intolerance, polyuria or polydipsia.  HEMATOLOGICAL:  No easy bruising or bleedings:  .     PHYSICAL EXAMINATION:    GENERAL APPEARANCE:  Pt. is not currently dyspneic, in no distress. Pt. is alert, oriented, and pleasant.  HEENT:  Pupils are normal and react normally. No icterus. Mucous membranes well colored.  NECK:  Supple. No lymphadenopathy. Jugular venous pressure not elevated. Carotids equal.   HEART:   The cardiac impulse has a normal quality. Regular. Normal S1 and S2. There are no murmurs, rubs or gallops noted  CHEST:  Bilat expir rhonchi, few low pitched expir wheezes.  ABDOMEN:  Soft and nontender.   EXTREMITIES:  There is no cyanosis, clubbing or edema.   SKIN:  No rash or significant lesions are noted.  Neuro: Alert, awake, and O x 3.      LABS:                        13.0   9.01  )-----------( 161      ( 08 May 2019 07:24 )             39.1     05-08    138  |  106  |  10  ----------------------------<  208<H>  3.5   |  27  |  0.98    Ca    8.2<L>      08 May 2019 07:24  Phos  2.5     05-08  Mg     1.8     -    TPro  6.1  /  Alb  2.8<L>  /  TBili  0.5  /  DBili  x   /  AST  24  /  ALT  49  /  AlkPhos  114  05-08    LIVER FUNCTIONS - ( 08 May 2019 07:24 )  Alb: 2.8 g/dL / Pro: 6.1 gm/dL / ALK PHOS: 114 U/L / ALT: 49 U/L / AST: 24 U/L / GGT: x           PT/INR - ( 08 May 2019 00:22 )   PT: 11.6 sec;   INR: 1.04 ratio         PTT - ( 08 May 2019 00:22 )  PTT:32.0 sec      Urinalysis Basic - ( 08 May 2019 01:08 )    Color: Yellow / Appearance: Clear / S.020 / pH: x  Gluc: x / Ketone: Trace  / Bili: Negative / Urobili: Negative mg/dL   Blood: x / Protein: 30 mg/dL / Nitrite: Positive   Leuk Esterase: Moderate / RBC: 0-2 /HPF / WBC 11-25   Sq Epi: x / Non Sq Epi: Few / Bacteria: Moderate          RADIOLOGY & ADDITIONAL STUDIES:       EXAM:  CT CHEST                            PROCEDURE DATE:  2019          INTERPRETATION:  Clinical information: Cough    COMPARISON: None    PROCEDURE:   CT of the Chest was performed without intravenous contrast.  Sagittal and coronal reformats were performed.      FINDINGS:    LOWER NECK: Within normal limits.  AXILLA, MEDIASTINUM AND ANGELA: No lymphadenopathy.  VESSELS: Atherosclerotic arterial calcifications, including the coronary   arteries.  Borderline dilatation of the ascending aorta, measuring 4 cm.  HEART: The heart is normal in size.  No pericardial effusion.  PLEURA: No pleural effusion.  LUNGS AND LARGE AIRWAYS: No pulmonary nodule or mass. Patchy right lower   lobe airspace disease. Mild subsegmental left lower lobe atelectasis.   Patent central airways.   VISUALIZED UPPER ABDOMEN: Severe diffuse steatosis.  BONES: No acute abnormality.  CHEST WALL:  Unremarkable    IMPRESSION: Posterior right lower lobe airspace disease suspect for   pneumonia.

## 2019-05-11 NOTE — PROGRESS NOTE ADULT - ASSESSMENT
62 y/o Male with h/o multiple sclerosis, h/o recurrent UTIs, hypothyroidism, hiatal hernia, nephrolithiasis, HTN was admitted on 5/8 for cough and fever. Patient developed cough productive of yellow sputum x 3 days and a fever of 101F. In ER he was noted with fever to 102.4F and received cefepime.     1. Low grade fever.  Cough. RLL pneumonia ?aspiration. MS.  -weak; still cough  -improving, but slowly  -f/u BC x 2, urine c/s  -on cefepime 2 gm IV q12h and azithromycin 500 mg PO qd # 4  -tolerating abx well so far; no side effects noted  -continue abx coverage  -aspiration precautions  -monitor temps  -f/u CBC  -supportive care  2. Other issues:   -care per medicine

## 2019-05-11 NOTE — PROGRESS NOTE ADULT - SUBJECTIVE AND OBJECTIVE BOX
HOSPITALIST PROGRESS NOTE:  SUBJECTIVE:  PCP: PCP: Evelin  Neuro: Aj Monroy   : Jerome Tatianaeduardojohanny    Chief Complaint: Patient is a 63y old  Male who presents with a chief complaint of fever (08 May 2019 11:40)      HPI:  64 y/o M with PMH of multiple sclerosis, h/o recurrent UTIs, hypothyroidism, hiatal hernia, nephrolithiasis, HTN, recently admitted to [facility] for UTI, P/w cough and fever. Patient started to have cough productive of yellow sputum on  and a fever of 101 degrees. Patient was recently hospitalized at [facility] for 3 nights and treated for UTI. Denies nausea, vomiting abdominal pain, CP, SOB, dysuria, increased urinary frequency     :  Above Reviewed; Patient continues to have cough, wheezing and dyspnea; Not improving   :  Better after starting IV steroids yesterday; No other complaints   5/10: No overnight events; Patient still congested +Wheezing and cough  : Productive cough; Still having wheezing and congestion    Allergies:  No Known Allergies    REVIEW OF SYSTEMS:  See HPI. All other review of systems is negative unless indicated above.     OBJECTIVE  Physical Exam:  Vital Signs Last 24 Hrs  T(C): 36.4 (11 May 2019 05:11), Max: 37.2 (10 May 2019 20:22)  T(F): 97.5 (11 May 2019 05:11), Max: 99 (10 May 2019 20:22)  HR: 73 (11 May 2019 05:11) (73 - 91)  BP: 111/59 (11 May 2019 05:11) (111/59 - 143/70)  BP(mean): --  RR: 18 (11 May 2019 05:11) (18 - 20)  SpO2: 98% (11 May 2019 05:11) (93% - 98%)    Constitutional: NAD, awake and alert, well-developed  Neurological: AAO x 3, no focal deficits; LE weakness 2ndry to MS  HEENT: PERRLA, EOMI, MMM  Neck: Soft and supple, No LAD, No JVD  Respiratory: Breath sounds are clear bilaterally, + wheezing and rhonchi B/L  Cardiovascular: S1 and S2, regular rate and rhythm; no Murmurs, gallops or rubs  Gastrointestinal: Bowel Sounds present, soft, nontender, nondistended, no guarding, no rebound tenderness  Back: No CVA tenderness   Extremities: No peripheral edema  Vascular: 2+ peripheral pulses  Musculoskeletal: LE weakness B/L 2ndry to MS  Skin: No rashes  Breast: Deferred  Rectal: Deferred    MEDICATIONS  (STANDING):  aspirin enteric coated 81 milliGRAM(s) Oral daily  azithromycin   Tablet 500 milliGRAM(s) Oral daily  cefepime   IVPB 2000 milliGRAM(s) IV Intermittent every 12 hours  levothyroxine 75 MICROGram(s) Oral daily  metoprolol succinate ER 25 milliGRAM(s) Oral daily  modafinil 200 milliGRAM(s) Oral daily    Lab Results:  CBC  CBC Full  -  ( 11 May 2019 06:32 )  WBC Count : 11.63 K/uL  RBC Count : 4.17 M/uL  Hemoglobin : 13.2 g/dL  Hematocrit : 38.4 %  Platelet Count - Automated : 236 K/uL  Mean Cell Volume : 92.1 fl  Mean Cell Hemoglobin : 31.7 pg  Mean Cell Hemoglobin Concentration : 34.4 gm/dL  Auto Neutrophil # : x  Auto Lymphocyte # : x  Auto Monocyte # : x  Auto Eosinophil # : x  Auto Basophil # : x  Auto Neutrophil % : x  Auto Lymphocyte % : x  Auto Monocyte % : x  Auto Eosinophil % : x  Auto Basophil % : x    .		Differential:	[] Automated		[] Manual  Chemistry                        13.2   11.63 )-----------( 236      ( 11 May 2019 06:32 )             38.4         138  |  106  |  19  ----------------------------<  171<H>  3.9   |  27  |  0.84    Ca    8.4<L>      11 May 2019 06:32  Phos  3.2       Mg     2.4     11        MICROBIOLOGY/CULTURES:  Culture Results:   No growth ( @ 01:08)  Culture Results:   No growth to date. ( @ 00:22)  Culture Results:   No growth to date. ( @ 00:22)      RADIOLOGY RESULTS:    Urinalysis Basic - ( 08 May 2019 01:08 )    Color: Yellow / Appearance: Clear / S.020 / pH: x  Gluc: x / Ketone: Trace  / Bili: Negative / Urobili: Negative mg/dL   Blood: x / Protein: 30 mg/dL / Nitrite: Positive   Leuk Esterase: Moderate / RBC: 0-2 /HPF / WBC 11-25   Sq Epi: x / Non Sq Epi: Few / Bacteria: Moderate    MICROBIOLOGY/CULTURES:  Culture Results:   No growth to date. ( @ 00:22)  Culture Results:   No growth to date. ( @ 00:22)    RADIOLOGY/EKG:    < from: Xray Chest 1 View-PORTABLE IMMEDIATE (19 @ 00:49) >    Impression:    No acute disease    < end of copied text >    < from: CT Chest No Cont (19 @ 10:09) >    IMPRESSION: Posterior right lower lobe airspace disease suspect for   pneumonia.    < end of copied text >    < from: CT Chest No Cont (19 @ 10:09) >    IMPRESSION: Posterior right lower lobe airspace disease suspect for   pneumonia.    < end of copied text >

## 2019-05-11 NOTE — PROGRESS NOTE ADULT - SUBJECTIVE AND OBJECTIVE BOX
Date of service: 19 @ 09:30    Sitting in bed in NAD  Has cough with scant sputum  Weak looking  Low grade fever    ROS: denies dizziness, no HA, no abdominal pain, no diarrhea or constipation; no dysuria, no legs pain, no rashes    MEDICATIONS  (STANDING):  ALBUTerol/ipratropium for Nebulization 3 milliLiter(s) Nebulizer every 6 hours  aspirin enteric coated 81 milliGRAM(s) Oral daily  azithromycin   Tablet 500 milliGRAM(s) Oral daily  cefepime   IVPB 2000 milliGRAM(s) IV Intermittent every 12 hours  guaiFENesin ER 1200 milliGRAM(s) Oral every 12 hours  heparin  Injectable 5000 Unit(s) SubCutaneous every 12 hours  lactobacillus acidophilus 1 Tablet(s) Oral two times a day  levothyroxine 75 MICROGram(s) Oral daily  methylPREDNISolone sodium succinate Injectable 40 milliGRAM(s) IV Push every 12 hours  metoprolol succinate ER 25 milliGRAM(s) Oral daily  modafinil 200 milliGRAM(s) Oral daily      Vital Signs Last 24 Hrs  T(C): 36.4 (11 May 2019 05:11), Max: 37.2 (10 May 2019 20:22)  T(F): 97.5 (11 May 2019 05:11), Max: 99 (10 May 2019 20:22)  HR: 73 (11 May 2019 05:11) (73 - 91)  BP: 111/59 (11 May 2019 05:11) (111/59 - 143/70)  BP(mean): --  RR: 18 (11 May 2019 05:11) (18 - 20)  SpO2: 98% (11 May 2019 05:11) (93% - 98%)    Physical Exam:      Constitutional: frail looking  HEENT: NC/AT, EOMI, PERRLA, conjunctivae clear  Neck: supple; thyroid not palpable  Back: no tenderness  Respiratory: respiratory effort normal; crackles at bases  Cardiovascular: S1S2 regular, no murmurs  Abdomen: soft, not tender, not distended, positive BS  Genitourinary: no suprapubic tenderness  Lymphatic: no LN palpable  Musculoskeletal: no muscle tenderness, no joint swelling or tenderness  Extremities: no pedal edema  Neurological/ Psychiatric: AxOx3, moving all extremities  Skin: no rashes; no palpable lesions    Labs: reviewed                        13.2   1163 )-----------( 236      ( 11 May 2019 06:32 )             38.4     05-11    138  |  106  |  19  ----------------------------<  171<H>  3.9   |  27  |  0.84    Ca    8.4<L>      11 May 2019 06:32  Phos  3.2     05-11  Mg     2.4     05-11                        13.0   9.01  )-----------( 161      ( 08 May 2019 07:24 )             39.1     05-08    138  |  106  |  10  ----------------------------<  208<H>  3.5   |  27  |  0.98    Ca    8.2<L>      08 May 2019 07:24  Phos  2.5     05-08  Mg     1.8     08    TPro  6.1  /  Alb  2.8<L>  /  TBili  0.5  /  DBili  x   /  AST  24  /  ALT  49  /  AlkPhos  114  0508            Urinalysis Basic - ( 08 May 2019 01:08 )    Color: Yellow / Appearance: Clear / S.020 / pH: x  Gluc: x / Ketone: Trace  / Bili: Negative / Urobili: Negative mg/dL   Blood: x / Protein: 30 mg/dL / Nitrite: Positive   Leuk Esterase: Moderate / RBC: 0-2 /HPF / WBC 11-25   Sq Epi: x / Non Sq Epi: Few / Bacteria: Moderate    Rapid Respiratory Viral Panel (19 @ 00:40)    Rapid RVP Result: Detected:     Parainfluenza 1 (RapRVP): Detected:       Culture - Urine (collected 08 May 2019 01:08)  Source: .Urine None  Final Report (09 May 2019 15:19):    No growth    Culture - Blood (collected 08 May 2019 00:22)  Source: .Blood Blood-Peripheral  Preliminary Report (09 May 2019 11:01):    No growth to date.    Culture - Blood (collected 08 May 2019 00:22)  Source: .Blood Blood-Peripheral  Preliminary Report (09 May 2019 11:01):    No growth to date.    Radiology: all available radiological tests reviewed    < from: CT Chest No Cont (19 @ 10:09) >  Posterior right lower lobe airspace disease suspect for   pneumonia.    < end of copied text >      Advanced directives addressed: full resuscitation

## 2019-05-12 PROCEDURE — 99233 SBSQ HOSP IP/OBS HIGH 50: CPT

## 2019-05-12 RX ADMIN — CEFEPIME 100 MILLIGRAM(S): 1 INJECTION, POWDER, FOR SOLUTION INTRAMUSCULAR; INTRAVENOUS at 05:50

## 2019-05-12 RX ADMIN — Medication 81 MILLIGRAM(S): at 10:54

## 2019-05-12 RX ADMIN — Medication 0.5 MILLIGRAM(S): at 09:19

## 2019-05-12 RX ADMIN — Medication 1200 MILLIGRAM(S): at 05:50

## 2019-05-12 RX ADMIN — Medication 40 MILLIGRAM(S): at 05:50

## 2019-05-12 RX ADMIN — Medication 3 MILLILITER(S): at 09:19

## 2019-05-12 RX ADMIN — CEFEPIME 100 MILLIGRAM(S): 1 INJECTION, POWDER, FOR SOLUTION INTRAMUSCULAR; INTRAVENOUS at 16:41

## 2019-05-12 RX ADMIN — Medication 1 TABLET(S): at 05:50

## 2019-05-12 RX ADMIN — Medication 1 TABLET(S): at 16:41

## 2019-05-12 RX ADMIN — Medication 3 MILLILITER(S): at 01:18

## 2019-05-12 RX ADMIN — Medication 40 MILLIGRAM(S): at 16:41

## 2019-05-12 RX ADMIN — Medication 25 MILLIGRAM(S): at 05:50

## 2019-05-12 RX ADMIN — Medication 1 MILLIGRAM(S): at 23:27

## 2019-05-12 RX ADMIN — MONTELUKAST 10 MILLIGRAM(S): 4 TABLET, CHEWABLE ORAL at 21:00

## 2019-05-12 RX ADMIN — HEPARIN SODIUM 5000 UNIT(S): 5000 INJECTION INTRAVENOUS; SUBCUTANEOUS at 05:50

## 2019-05-12 RX ADMIN — Medication 75 MICROGRAM(S): at 05:50

## 2019-05-12 RX ADMIN — Medication 0.5 MILLIGRAM(S): at 20:25

## 2019-05-12 RX ADMIN — MODAFINIL 200 MILLIGRAM(S): 200 TABLET ORAL at 10:54

## 2019-05-12 RX ADMIN — Medication 3 MILLILITER(S): at 20:21

## 2019-05-12 RX ADMIN — Medication 3 MILLILITER(S): at 15:57

## 2019-05-12 RX ADMIN — HEPARIN SODIUM 5000 UNIT(S): 5000 INJECTION INTRAVENOUS; SUBCUTANEOUS at 16:41

## 2019-05-12 RX ADMIN — Medication 1200 MILLIGRAM(S): at 16:40

## 2019-05-12 NOTE — PROGRESS NOTE ADULT - ASSESSMENT
62 y/o M with PMH of multiple sclerosis, h/o recurrent UTIs, hypothyroidism, nephrolithiasis, HTN, recently admitted to [facility] for UTI (currently on marobid), P/w cough    *Sepsis - possibly 2/2 GNR PNA with superimposed parainfluenza infection and UTI  -CT chest RLL PNA  -S/P azithro#5 continue cefepime #5  -solumedrol Q12H - taper to solumedrol QD rosario  -Mucinex, Pulmicort and Singulair  -Nebs ATC  -aspiration precautions  -Isolation for parainfluenza infection   -ID consult appreciated   -Pulm consult appreciated     *SATHISH  -not on C-PAP treatment.  NC O2 during hs.    *H/o Multiple sclerosis / hypothyroidism / HTN   -C/w home meds and outpatient management if conditions remain stable during hospitalization     *DVT ppx  -Heparin SQ    *Dispo - taper solumedrol to QD rosario; POssible D/C rosario if improved

## 2019-05-12 NOTE — PROGRESS NOTE ADULT - ASSESSMENT
64 y/o Male with h/o multiple sclerosis, h/o recurrent UTIs, hypothyroidism, hiatal hernia, nephrolithiasis, HTN was admitted on 5/8 for cough and fever. Patient developed cough productive of yellow sputum x 3 days and a fever of 101F. In ER he was noted with fever to 102.4F and received cefepime.     1. Low grade fever improving. Cough. RLL pneumonia ?aspiration. MS.  -weak; still cough  -improving, but slowly  -f/u BC x 2, urine c/s  -on cefepime 2 gm IV q12h and azithromycin 500 mg PO qd # 5  -tolerating abx well so far; no side effects noted  -d/c azithromycin  -continue abx coverage with ceftriaxone for now  -aspiration precautions  -monitor temps  -f/u CBC  -supportive care  2. Other issues:   -care per medicine

## 2019-05-12 NOTE — PROGRESS NOTE ADULT - SUBJECTIVE AND OBJECTIVE BOX
HOSPITALIST PROGRESS NOTE:  SUBJECTIVE:  PCP: PCP: Evelin  Neuro: Aj Monroy   : Jerome Tatianaeduardojohanny    Chief Complaint: Patient is a 63y old  Male who presents with a chief complaint of fever (08 May 2019 11:40)      HPI:  64 y/o M with PMH of multiple sclerosis, h/o recurrent UTIs, hypothyroidism, hiatal hernia, nephrolithiasis, HTN, recently admitted to [facility] for UTI, P/w cough and fever. Patient started to have cough productive of yellow sputum on  and a fever of 101 degrees. Patient was recently hospitalized at [facility] for 3 nights and treated for UTI. Denies nausea, vomiting abdominal pain, CP, SOB, dysuria, increased urinary frequency     :  Above Reviewed; Patient continues to have cough, wheezing and dyspnea; Not improving   :  Better after starting IV steroids yesterday; No other complaints   5/10: No overnight events; Patient still congested +Wheezing and cough  : Productive cough; Still having wheezing and congestion  :  Still having cough and wheezing;     Allergies:  No Known Allergies    REVIEW OF SYSTEMS:  See HPI. All other review of systems is negative unless indicated above.     OBJECTIVE  Physical Exam:  Vital Signs Last 24 Hrs  T(C): 37 (12 May 2019 10:57), Max: 37 (12 May 2019 10:57)  T(F): 98.6 (12 May 2019 10:57), Max: 98.6 (12 May 2019 10:57)  HR: 88 (12 May 2019 10:57) (72 - 88)  BP: 148/78 (12 May 2019 10:57) (125/68 - 148/78)  BP(mean): --  RR: 18 (12 May 2019 10:57) (16 - 18)  SpO2: 95% (12 May 2019 10:59) (95% - 98%)    Constitutional: NAD, awake and alert, well-developed  Neurological: AAO x 3, no focal deficits; LE weakness 2ndry to MS  HEENT: PERRLA, EOMI, MMM  Neck: Soft and supple, No LAD, No JVD  Respiratory: Breath sounds are clear bilaterally, + wheezing and rhonchi B/L - Improving   Cardiovascular: S1 and S2, regular rate and rhythm; no Murmurs, gallops or rubs  Gastrointestinal: Bowel Sounds present, soft, nontender, nondistended, no guarding, no rebound tenderness  Back: No CVA tenderness   Extremities: No peripheral edema  Vascular: 2+ peripheral pulses  Musculoskeletal: LE weakness B/L 2ndry to MS  Skin: No rashes  Breast: Deferred  Rectal: Deferred    MEDICATIONS  (STANDING):  aspirin enteric coated 81 milliGRAM(s) Oral daily  azithromycin   Tablet 500 milliGRAM(s) Oral daily  cefepime   IVPB 2000 milliGRAM(s) IV Intermittent every 12 hours  levothyroxine 75 MICROGram(s) Oral daily  metoprolol succinate ER 25 milliGRAM(s) Oral daily  modafinil 200 milliGRAM(s) Oral daily    Lab Results:  CBC  CBC Full  -  ( 11 May 2019 06:32 )  WBC Count : 11.63 K/uL  RBC Count : 4.17 M/uL  Hemoglobin : 13.2 g/dL  Hematocrit : 38.4 %  Platelet Count - Automated : 236 K/uL  Mean Cell Volume : 92.1 fl  Mean Cell Hemoglobin : 31.7 pg  Mean Cell Hemoglobin Concentration : 34.4 gm/dL  Auto Neutrophil # : x  Auto Lymphocyte # : x  Auto Monocyte # : x  Auto Eosinophil # : x  Auto Basophil # : x  Auto Neutrophil % : x  Auto Lymphocyte % : x  Auto Monocyte % : x  Auto Eosinophil % : x  Auto Basophil % : x    .		Differential:	[] Automated		[] Manual  Chemistry                        13.2   11.63 )-----------( 236      ( 11 May 2019 06:32 )             38.4         138  |  106  |  19  ----------------------------<  171<H>  3.9   |  27  |  0.84    Ca    8.4<L>      11 May 2019 06:32  Phos  3.2       Mg     2.4     11      MICROBIOLOGY/CULTURES:  Culture Results:   No growth ( @ 01:08)  Culture Results:   No growth to date. ( @ 00:22)  Culture Results:   No growth to date. ( @ 00:22)    Urinalysis Basic - ( 08 May 2019 01:08 )    Color: Yellow / Appearance: Clear / S.020 / pH: x  Gluc: x / Ketone: Trace  / Bili: Negative / Urobili: Negative mg/dL   Blood: x / Protein: 30 mg/dL / Nitrite: Positive   Leuk Esterase: Moderate / RBC: 0-2 /HPF / WBC 11-25   Sq Epi: x / Non Sq Epi: Few / Bacteria: Moderate    MICROBIOLOGY/CULTURES:  Culture Results:   No growth to date. ( @ 00:22)  Culture Results:   No growth to date. ( @ 00:22)    RADIOLOGY/EKG:    < from: Xray Chest 1 View-PORTABLE IMMEDIATE (19 @ 00:49) >    Impression:    No acute disease    < end of copied text >    < from: CT Chest No Cont (19 @ 10:09) >    IMPRESSION: Posterior right lower lobe airspace disease suspect for   pneumonia.    < end of copied text >    < from: CT Chest No Cont (19 @ 10:09) >    IMPRESSION: Posterior right lower lobe airspace disease suspect for   pneumonia.    < end of copied text >

## 2019-05-12 NOTE — PROGRESS NOTE ADULT - ASSESSMENT
RLL PNA. HCAP. ? aspiration.  O2 as needed. IV Abx's as per ID. aspiration precautions.     Positive para flu. asthmatic bronchitis. COPD.  IV steroids, nebs, budesonide nebs and montelukast.     h.o. SATHISH.  not on C-PAP treatment.  NC O2 during hs.    MS    Renal stones.  h.o. UTI 1 mo ago. RLL PNA. HCAP. ? aspiration.  O2 as needed. IV Abx's as per ID. aspiration precautions.     Positive para flu. asthmatic bronchitis. COPD.  IV steroids (decrease solumedrol from 40 mg bid to 40 mg/d tomorrow if continues to improve), nebs, budesonide nebs and montelukast.     h.o. SATHISH.  not on C-PAP treatment.  NC O2 during hs.    MS    Renal stones.  h.o. UTI 1 mo ago.

## 2019-05-12 NOTE — PROGRESS NOTE ADULT - SUBJECTIVE AND OBJECTIVE BOX
Date of service: 19 @ 08:44    Sitting in bed in NAD  Cough is becoming dry  No SOB at rest    ROS: no fever or chills; denies dizziness, no HA, no abdominal pain, no diarrhea or constipation; no dysuria, no legs pain, no rashes    MEDICATIONS  (STANDING):  ALBUTerol/ipratropium for Nebulization 3 milliLiter(s) Nebulizer every 6 hours  aspirin enteric coated 81 milliGRAM(s) Oral daily  azithromycin   Tablet 500 milliGRAM(s) Oral daily  buDESOnide   0.5 milliGRAM(s) Respule 0.5 milliGRAM(s) Inhalation every 12 hours  cefepime   IVPB 2000 milliGRAM(s) IV Intermittent every 12 hours  guaiFENesin ER 1200 milliGRAM(s) Oral every 12 hours  heparin  Injectable 5000 Unit(s) SubCutaneous every 12 hours  lactobacillus acidophilus 1 Tablet(s) Oral two times a day  levothyroxine 75 MICROGram(s) Oral daily  methylPREDNISolone sodium succinate Injectable 40 milliGRAM(s) IV Push every 12 hours  metoprolol succinate ER 25 milliGRAM(s) Oral daily  modafinil 200 milliGRAM(s) Oral daily  montelukast 10 milliGRAM(s) Oral at bedtime      Vital Signs Last 24 Hrs  T(C): 36.5 (12 May 2019 05:43), Max: 37.2 (11 May 2019 10:56)  T(F): 97.7 (12 May 2019 05:43), Max: 98.9 (11 May 2019 10:56)  HR: 72 (12 May 2019 05:43) (72 - 89)  BP: 125/68 (12 May 2019 05:43) (125/68 - 143/83)  BP(mean): --  RR: 18 (12 May 2019 05:43) (16 - 18)  SpO2: 96% (12 May 2019 05:43) (96% - 96%)    Physical Exam:      Constitutional: frail looking  HEENT: NC/AT, EOMI, PERRLA, conjunctivae clear  Neck: supple; thyroid not palpable  Back: no tenderness  Respiratory: respiratory effort normal; crackles at bases  Cardiovascular: S1S2 regular, no murmurs  Abdomen: soft, not tender, not distended, positive BS  Genitourinary: no suprapubic tenderness  Lymphatic: no LN palpable  Musculoskeletal: no muscle tenderness, no joint swelling or tenderness  Extremities: no pedal edema  Neurological/ Psychiatric: AxOx3, moving all extremities  Skin: no rashes; no palpable lesions    Labs: reviewed                        13.2   11.63 )-----------( 236      ( 11 May 2019 06:32 )             38.4     05-11    138  |  106  |  19  ----------------------------<  171<H>  3.9   |  27  |  0.84    Ca    8.4<L>      11 May 2019 06:32  Phos  3.2     05-11  Mg     2.4                             13.0   9.01  )-----------( 161      ( 08 May 2019 07:24 )             39.1     05-08    138  |  106  |  10  ----------------------------<  208<H>  3.5   |  27  |  0.98    Ca    8.2<L>      08 May 2019 07:24  Phos  2.5     05-  Mg     1.8         TPro  6.1  /  Alb  2.8<L>  /  TBili  0.5  /  DBili  x   /  AST  24  /  ALT  49  /  AlkPhos  114  -08            Urinalysis Basic - ( 08 May 2019 01:08 )    Color: Yellow / Appearance: Clear / S.020 / pH: x  Gluc: x / Ketone: Trace  / Bili: Negative / Urobili: Negative mg/dL   Blood: x / Protein: 30 mg/dL / Nitrite: Positive   Leuk Esterase: Moderate / RBC: 0-2 /HPF / WBC 11-25   Sq Epi: x / Non Sq Epi: Few / Bacteria: Moderate    Rapid Respiratory Viral Panel (19 @ 00:40)    Rapid RVP Result: Detected:     Parainfluenza 1 (RapRVP): Detected:       Culture - Urine (collected 08 May 2019 01:08)  Source: .Urine None  Final Report (09 May 2019 15:19):    No growth    Culture - Blood (collected 08 May 2019 00:22)  Source: .Blood Blood-Peripheral  Preliminary Report (09 May 2019 11:01):    No growth to date.    Culture - Blood (collected 08 May 2019 00:22)  Source: .Blood Blood-Peripheral  Preliminary Report (09 May 2019 11:01):    No growth to date.    Radiology: all available radiological tests reviewed    < from: CT Chest No Cont (19 @ 10:09) >  Posterior right lower lobe airspace disease suspect for   pneumonia.    < end of copied text >      Advanced directives addressed: full resuscitation

## 2019-05-12 NOTE — PROGRESS NOTE ADULT - SUBJECTIVE AND OBJECTIVE BOX
HPI:  64 y/o M with PMH of multiple sclerosis, h/o recurrent UTIs, hypothyroidism, hiatal hernia, nephrolithiasis, HTN, recently admitted to [facility] for UTI, P/w cough and fever. Patient started to have cough productive of yellow sputum on  and a fever of 101 degrees. Patient was recently hospitalized at [facility] for 3 nights and treated for UTI. Denies nausea, vomiting abdominal pain, CP, SOB, dysuria, increased urinary frequency     ex-cig smoker 10 pk-yrs, quit in . denies past h.o. PNA.      :  feeling somewhat better today. has cough, sputum production. breathing is improving.    : no distress. has cough.   : feels mildly better today. awake, no distress.    PSH: Tonsillectomy     Social hx: Denies x 3    Family Hx: Denies (08 May 2019 03:34)      PAST MEDICAL & SURGICAL HISTORY:  Sleep apnea  Hyperlipidemia  Hypothyroidism  Kidney calculus  Multiple sclerosis: dx  - tysabri iv infusion monthly  H/O lithotripsy:   H/O angioplasty:  chronic cerebrospinal vascular insufficiency - treatment for MS by Dr Trace López  History of tonsillectomy      MEDICATIONS  (STANDING):  aspirin enteric coated 81 milliGRAM(s) Oral daily  azithromycin   Tablet 500 milliGRAM(s) Oral daily  cefepime   IVPB 2000 milliGRAM(s) IV Intermittent every 12 hours  guaiFENesin ER 1200 milliGRAM(s) Oral every 12 hours  heparin  Injectable 5000 Unit(s) SubCutaneous every 12 hours  levothyroxine 75 MICROGram(s) Oral daily  methylPREDNISolone sodium succinate Injectable 40 milliGRAM(s) IV Push every 8 hours  metoprolol succinate ER 25 milliGRAM(s) Oral daily  modafinil 200 milliGRAM(s) Oral daily    MEDICATIONS  (PRN):  acetaminophen   Tablet .. 650 milliGRAM(s) Oral every 6 hours PRN Temp greater or equal to 38C (100.4F), Moderate Pain (4 - 6)  ALBUTerol    90 MICROgram(s) HFA Inhaler 1 Puff(s) Inhalation every 6 hours PRN Shortness of Breath  clonazePAM  Tablet 1 milliGRAM(s) Oral daily PRN -  docusate sodium 100 milliGRAM(s) Oral three times a day PRN Constipation  polyethylene glycol 3350 17 Gram(s) Oral two times a day PRN Constipation      Allergies    No Known Allergies    Intolerances        SOCIAL HISTORY: Denies tobacco, etoh abuse or illicit drug use    FAMILY HISTORY:      Vital Signs Last 24 Hrs  T(C): 36.3 (09 May 2019 05:08), Max: 37.7 (08 May 2019 10:47)  T(F): 97.4 (09 May 2019 05:08), Max: 99.8 (08 May 2019 10:47)  HR: 82 (09 May 2019 05:08) (82 - 109)  BP: 123/74 (09 May 2019 05:08) (123/74 - 152/94)  BP(mean): --  RR: 19 (09 May 2019 05:08) (19 - 20)  SpO2: 94% (09 May 2019 05:08) (94% - 97%)    REVIEW OF SYSTEMS:    CONSTITUTIONAL:  As per HPI.  HEENT:  Eyes:  No diplopia or blurred vision. ENT:  No earache, sore throat or runny nose.  CARDIOVASCULAR:  No pressure, squeezing, tightness, heaviness or aching about the chest, neck, axilla or epigastrium.  RESPIRATORY:  see above.  GASTROINTESTINAL:  No nausea, vomiting or diarrhea.  GENITOURINARY:  No dysuria, frequency or urgency.  MUSCULOSKELETAL:  As per HPI.  SKIN:  No change in skin, hair or nails.  NEUROLOGIC:  No paresthesias, fasciculations, seizures or weakness.  PSYCHIATRIC:  No disorder of thought or mood.  ENDOCRINE:  No heat or cold intolerance, polyuria or polydipsia.  HEMATOLOGICAL:  No easy bruising or bleedings:  .     PHYSICAL EXAMINATION:    GENERAL APPEARANCE:  Pt. is not currently dyspneic, in no distress. Pt. is alert, oriented, and pleasant.  HEENT:  Pupils are normal and react normally. No icterus. Mucous membranes well colored.  NECK:  Supple. No lymphadenopathy. Jugular venous pressure not elevated. Carotids equal.   HEART:   The cardiac impulse has a normal quality. Regular. Normal S1 and S2. There are no murmurs, rubs or gallops noted  CHEST:  Bilat expir rhonchi.   ABDOMEN:  Soft and nontender.   EXTREMITIES:  There is no cyanosis, clubbing or edema.   SKIN:  No rash or significant lesions are noted.  Neuro: Alert, awake, and O x 3.      LABS:                        13.0   9.01  )-----------( 161      ( 08 May 2019 07:24 )             39.1     05-08    138  |  106  |  10  ----------------------------<  208<H>  3.5   |  27  |  0.98    Ca    8.2<L>      08 May 2019 07:24  Phos  2.5     -08  Mg     1.8         TPro  6.1  /  Alb  2.8<L>  /  TBili  0.5  /  DBili  x   /  AST  24  /  ALT  49  /  AlkPhos  114  -08    LIVER FUNCTIONS - ( 08 May 2019 07:24 )  Alb: 2.8 g/dL / Pro: 6.1 gm/dL / ALK PHOS: 114 U/L / ALT: 49 U/L / AST: 24 U/L / GGT: x           PT/INR - ( 08 May 2019 00:22 )   PT: 11.6 sec;   INR: 1.04 ratio         PTT - ( 08 May 2019 00:22 )  PTT:32.0 sec      Urinalysis Basic - ( 08 May 2019 01:08 )    Color: Yellow / Appearance: Clear / S.020 / pH: x  Gluc: x / Ketone: Trace  / Bili: Negative / Urobili: Negative mg/dL   Blood: x / Protein: 30 mg/dL / Nitrite: Positive   Leuk Esterase: Moderate / RBC: 0-2 /HPF / WBC 11-25   Sq Epi: x / Non Sq Epi: Few / Bacteria: Moderate          RADIOLOGY & ADDITIONAL STUDIES:       EXAM:  CT CHEST                            PROCEDURE DATE:  2019          INTERPRETATION:  Clinical information: Cough    COMPARISON: None    PROCEDURE:   CT of the Chest was performed without intravenous contrast.  Sagittal and coronal reformats were performed.      FINDINGS:    LOWER NECK: Within normal limits.  AXILLA, MEDIASTINUM AND ANGELA: No lymphadenopathy.  VESSELS: Atherosclerotic arterial calcifications, including the coronary   arteries.  Borderline dilatation of the ascending aorta, measuring 4 cm.  HEART: The heart is normal in size.  No pericardial effusion.  PLEURA: No pleural effusion.  LUNGS AND LARGE AIRWAYS: No pulmonary nodule or mass. Patchy right lower   lobe airspace disease. Mild subsegmental left lower lobe atelectasis.   Patent central airways.   VISUALIZED UPPER ABDOMEN: Severe diffuse steatosis.  BONES: No acute abnormality.  CHEST WALL:  Unremarkable    IMPRESSION: Posterior right lower lobe airspace disease suspect for   pneumonia.

## 2019-05-13 ENCOUNTER — TRANSCRIPTION ENCOUNTER (OUTPATIENT)
Age: 64
End: 2019-05-13

## 2019-05-13 VITALS
SYSTOLIC BLOOD PRESSURE: 149 MMHG | RESPIRATION RATE: 17 BRPM | TEMPERATURE: 97 F | DIASTOLIC BLOOD PRESSURE: 80 MMHG | HEART RATE: 89 BPM | OXYGEN SATURATION: 95 %

## 2019-05-13 RX ORDER — IPRATROPIUM/ALBUTEROL SULFATE 18-103MCG
3 AEROSOL WITH ADAPTER (GRAM) INHALATION
Qty: 1 | Refills: 0
Start: 2019-05-13 | End: 2019-05-26

## 2019-05-13 RX ORDER — ALBUTEROL 90 UG/1
3 AEROSOL, METERED ORAL
Qty: 0 | Refills: 0 | DISCHARGE

## 2019-05-13 RX ORDER — MONTELUKAST 4 MG/1
1 TABLET, CHEWABLE ORAL
Qty: 30 | Refills: 0
Start: 2019-05-13 | End: 2019-06-11

## 2019-05-13 RX ORDER — LEVOTHYROXINE SODIUM 125 MCG
1 TABLET ORAL
Qty: 0 | Refills: 0 | DISCHARGE

## 2019-05-13 RX ADMIN — Medication 1200 MILLIGRAM(S): at 05:52

## 2019-05-13 RX ADMIN — Medication 3 MILLILITER(S): at 13:37

## 2019-05-13 RX ADMIN — Medication 1 TABLET(S): at 05:52

## 2019-05-13 RX ADMIN — Medication 3 MILLILITER(S): at 01:22

## 2019-05-13 RX ADMIN — HEPARIN SODIUM 5000 UNIT(S): 5000 INJECTION INTRAVENOUS; SUBCUTANEOUS at 05:52

## 2019-05-13 RX ADMIN — MODAFINIL 200 MILLIGRAM(S): 200 TABLET ORAL at 13:10

## 2019-05-13 RX ADMIN — Medication 0.5 MILLIGRAM(S): at 07:36

## 2019-05-13 RX ADMIN — Medication 25 MILLIGRAM(S): at 05:52

## 2019-05-13 RX ADMIN — Medication 75 MICROGRAM(S): at 05:52

## 2019-05-13 RX ADMIN — Medication 3 MILLILITER(S): at 07:36

## 2019-05-13 RX ADMIN — Medication 40 MILLIGRAM(S): at 05:52

## 2019-05-13 RX ADMIN — CEFEPIME 100 MILLIGRAM(S): 1 INJECTION, POWDER, FOR SOLUTION INTRAMUSCULAR; INTRAVENOUS at 05:52

## 2019-05-13 RX ADMIN — Medication 81 MILLIGRAM(S): at 13:10

## 2019-05-13 NOTE — PROGRESS NOTE ADULT - SUBJECTIVE AND OBJECTIVE BOX
Date of service: 19 @ 12:57    Lying in bed in NAD  Alert   Cough is improving    ROS: no fever or chills; denies dizziness, no HA, no abdominal pain, no diarrhea or constipation; no dysuria, no legs pain, no rashes    MEDICATIONS  (STANDING):  ALBUTerol/ipratropium for Nebulization 3 milliLiter(s) Nebulizer every 6 hours  aspirin enteric coated 81 milliGRAM(s) Oral daily  buDESOnide   0.5 milliGRAM(s) Respule 0.5 milliGRAM(s) Inhalation every 12 hours  cefepime   IVPB 2000 milliGRAM(s) IV Intermittent every 12 hours  guaiFENesin ER 1200 milliGRAM(s) Oral every 12 hours  heparin  Injectable 5000 Unit(s) SubCutaneous every 12 hours  lactobacillus acidophilus 1 Tablet(s) Oral two times a day  levothyroxine 75 MICROGram(s) Oral daily  methylPREDNISolone sodium succinate Injectable 40 milliGRAM(s) IV Push every 12 hours  metoprolol succinate ER 25 milliGRAM(s) Oral daily  modafinil 200 milliGRAM(s) Oral daily  montelukast 10 milliGRAM(s) Oral at bedtime      Vital Signs Last 24 Hrs  T(C): 36.3 (13 May 2019 10:57), Max: 36.8 (13 May 2019 05:44)  T(F): 97.3 (13 May 2019 10:57), Max: 98.3 (13 May 2019 05:44)  HR: 89 (13 May 2019 10:57) (74 - 97)  BP: 149/80 (13 May 2019 10:57) (129/73 - 152/79)  BP(mean): --  RR: 17 (13 May 2019 10:57) (17 - 17)  SpO2: 95% (13 May 2019 10:57) (95% - 98%)    Physical Exam:      Constitutional: frail looking  HEENT: NC/AT, EOMI, PERRLA, conjunctivae clear  Neck: supple; thyroid not palpable  Back: no tenderness  Respiratory: respiratory effort normal; crackles at bases  Cardiovascular: S1S2 regular, no murmurs  Abdomen: soft, not tender, not distended, positive BS  Genitourinary: no suprapubic tenderness  Lymphatic: no LN palpable  Musculoskeletal: no muscle tenderness, no joint swelling or tenderness  Extremities: no pedal edema  Neurological/ Psychiatric: AxOx3, moving all extremities  Skin: no rashes; no palpable lesions    Labs: reviewed                        13.2   11.63 )-----------( 236      ( 11 May 2019 06:32 )             38.4     05-11    138  |  106  |  19  ----------------------------<  171<H>  3.9   |  27  |  0.84    Ca    8.4<L>      11 May 2019 06:32  Phos  3.2     05-11  Mg     2.4                             13.0   9.01  )-----------( 161      ( 08 May 2019 07:24 )             39.1     05-08    138  |  106  |  10  ----------------------------<  208<H>  3.5   |  27  |  0.98    Ca    8.2<L>      08 May 2019 07:24  Phos  2.5     05-08  Mg     1.8         TPro  6.1  /  Alb  2.8<L>  /  TBili  0.5  /  DBili  x   /  AST  24  /  ALT  49  /  AlkPhos  114  05-08            Urinalysis Basic - ( 08 May 2019 01:08 )    Color: Yellow / Appearance: Clear / S.020 / pH: x  Gluc: x / Ketone: Trace  / Bili: Negative / Urobili: Negative mg/dL   Blood: x / Protein: 30 mg/dL / Nitrite: Positive   Leuk Esterase: Moderate / RBC: 0-2 /HPF / WBC 11-25   Sq Epi: x / Non Sq Epi: Few / Bacteria: Moderate    Rapid Respiratory Viral Panel (19 @ 00:40)    Rapid RVP Result: Detected:     Parainfluenza 1 (RapRVP): Detected:       Culture - Urine (collected 08 May 2019 01:08)  Source: .Urine None  Final Report (09 May 2019 15:19):    No growth    Culture - Blood (collected 08 May 2019 00:22)  Source: .Blood Blood-Peripheral  Preliminary Report (09 May 2019 11:01):    No growth to date.    Culture - Blood (collected 08 May 2019 00:22)  Source: .Blood Blood-Peripheral  Preliminary Report (09 May 2019 11:01):    No growth to date.    Radiology: all available radiological tests reviewed    < from: CT Chest No Cont (19 @ 10:09) >  Posterior right lower lobe airspace disease suspect for   pneumonia.    < end of copied text >      Advanced directives addressed: full resuscitation

## 2019-05-13 NOTE — DISCHARGE NOTE PROVIDER - HOSPITAL COURSE
Vital Signs Last 24 Hrs    T(C): 36.3 (13 May 2019 10:57), Max: 36.8 (13 May 2019 05:44)    T(F): 97.3 (13 May 2019 10:57), Max: 98.3 (13 May 2019 05:44)    HR: 86 (13 May 2019 13:38) (74 - 97)    BP: 149/80 (13 May 2019 10:57) (129/73 - 152/79)    BP(mean): --    RR: 17 (13 May 2019 10:57) (17 - 17)    SpO2: 95% (13 May 2019 10:57) (95% - 98%)        HEENT:   pupils equal and reactive, EOMI, no oropharyngeal lesions, erythema, exudates, oral thrush        NECK:   supple, no carotid bruits, no palpable lymph nodes, no thyromegaly        CV:  +S1, +S2, regular, no murmurs or rubs        RESP:   lungs clear to auscultation bilaterally, no wheezing, rales, rhonchi, good air entry bilaterally        BREAST:  not examined        GI:  abdomen soft, non-tender, non-distended, normal BS, no bruits, no abdominal masses, no palpable masses        RECTAL:  not examined        :  not examined        MSK:   normal muscle tone, no atrophy, no rigidity, no contractions        EXT:   no clubbing, no cyanosis, no edema, no calf pain, swelling or erythema        VASCULAR:  pulses equal and symmetric in the upper and lower extremities        NEURO:  AAOX3, no focal neurological deficits, follows all commands, able to move extremities spontaneously        SKIN:  no ulcers, lesions or rashes            Hospital course:        64 y/o M with PMH of multiple sclerosis, h/o recurrent UTIs, hypothyroidism, nephrolithiasis, HTN, recently admitted to [facility] for UTI (currently on marobid), P/w cough    Admitted with Sepsis - possibly 2/2 GNR PNA with superimposed parainfluenza infection and UTI. CT chest with RLL PNA. completed several days of iv azithro and cefepime. Patient had sig    bronchitis likel symptoms and had to be started on iv solumederol. Patietn is now tapered to oral predinsone with sig resolution of his symtoms. Patient will c/w predinsone taper at  home. He has already    completed iv abx while inpatient.

## 2019-05-13 NOTE — DISCHARGE NOTE NURSING/CASE MANAGEMENT/SOCIAL WORK - NSDCDPATPORTLINK_GEN_ALL_CORE
You can access the [facility] Portal, offered by [facility], by registering with the following website: [email or web address]

## 2019-05-13 NOTE — PHYSICAL THERAPY INITIAL EVALUATION ADULT - MODALITIES TREATMENT COMMENTS
pt left seated in chair post Eval; chair alarm in place; LE's elevated on stool/pillow; callbell in reach; pt instructed not to get up alone; call nursing for assist; rogelio well; denied pain; isolation maintained; RN Xiomara made aware pt OOB

## 2019-05-13 NOTE — PHYSICAL THERAPY INITIAL EVALUATION ADULT - MANUAL MUSCLE TESTING RESULTS, REHAB EVAL
no strength deficits were identified/except L UE: shld FE 3+/5; L LE: hip / knee flex 2-/5; knee ext 3+/5; ankle PF/DF 0/5; digit flex/ext 2/5; R LE: hip/knee flex 3+/5; knee ext 4/5; ankle DF 3+/5; PF 4-/5

## 2019-05-13 NOTE — PHYSICAL THERAPY INITIAL EVALUATION ADULT - CRITERIA FOR SKILLED THERAPEUTIC INTERVENTIONS
pt does not require further PT intervention @ this time; currently @ baseline functional status; recommend daily OOB to chair on nursing floor care

## 2019-05-13 NOTE — DISCHARGE NOTE PROVIDER - NSDCCPCAREPLAN_GEN_ALL_CORE_FT
PRINCIPAL DISCHARGE DIAGNOSIS  Diagnosis: UTI (urinary tract infection)  Assessment and Plan of Treatment: completed abx therapy      SECONDARY DISCHARGE DIAGNOSES  Diagnosis: Bronchitis  Assessment and Plan of Treatment: continue with prednisone taper as prescribed: start 40mg daily x 2days, then 30mg daily x 2days, then 20mg daily x 2days, then 10mg daily x 2 days    Diagnosis: Pneumonia  Assessment and Plan of Treatment: completed anitbiotic therapy

## 2019-05-13 NOTE — DISCHARGE NOTE PROVIDER - CARE PROVIDER_API CALL
Lavelle Abel)  Internal Medicine; Pulmonary Disease  [address] [place] [address]  Phone: [phone]  Fax: [phone]  Follow Up Time:

## 2019-05-13 NOTE — PROGRESS NOTE ADULT - ASSESSMENT
62 y/o Male with h/o multiple sclerosis, h/o recurrent UTIs, hypothyroidism, hiatal hernia, nephrolithiasis, HTN was admitted on 5/8 for cough and fever. Patient developed cough productive of yellow sputum x 3 days and a fever of 101F. In ER he was noted with fever to 102.4F and received cefepime.     1. Low grade fever improving. Cough. RLL pneumonia ?aspiration. MS.  -weak; still cough  -improving, but slowly  -f/u BC x 2, urine c/s  -on cefepime 2 gm IV q12h # 6  -tolerating abx well so far; no side effects noted  -may complete abx therapy today  -aspiration precautions  -monitor temps  -f/u CBC  -supportive care  2. Other issues:   -care per medicine

## 2019-05-14 LAB
CULTURE RESULTS: SIGNIFICANT CHANGE UP
CULTURE RESULTS: SIGNIFICANT CHANGE UP
SPECIMEN SOURCE: SIGNIFICANT CHANGE UP
SPECIMEN SOURCE: SIGNIFICANT CHANGE UP

## 2019-05-16 DIAGNOSIS — N39.0 URINARY TRACT INFECTION, SITE NOT SPECIFIED: ICD-10-CM

## 2019-05-16 DIAGNOSIS — I10 ESSENTIAL (PRIMARY) HYPERTENSION: ICD-10-CM

## 2019-05-16 DIAGNOSIS — A41.9 SEPSIS, UNSPECIFIED ORGANISM: ICD-10-CM

## 2019-05-16 DIAGNOSIS — J15.6 PNEUMONIA DUE TO OTHER GRAM-NEGATIVE BACTERIA: ICD-10-CM

## 2019-05-16 DIAGNOSIS — G35 MULTIPLE SCLEROSIS: ICD-10-CM

## 2019-05-16 DIAGNOSIS — E03.9 HYPOTHYROIDISM, UNSPECIFIED: ICD-10-CM

## 2019-05-16 DIAGNOSIS — G47.33 OBSTRUCTIVE SLEEP APNEA (ADULT) (PEDIATRIC): ICD-10-CM

## 2019-05-16 DIAGNOSIS — Z79.82 LONG TERM (CURRENT) USE OF ASPIRIN: ICD-10-CM

## 2019-05-16 DIAGNOSIS — R50.9 FEVER, UNSPECIFIED: ICD-10-CM

## 2019-05-16 DIAGNOSIS — J44.0 CHRONIC OBSTRUCTIVE PULMONARY DISEASE WITH (ACUTE) LOWER RESPIRATORY INFECTION: ICD-10-CM

## 2019-05-16 DIAGNOSIS — Z87.442 PERSONAL HISTORY OF URINARY CALCULI: ICD-10-CM

## 2019-05-16 DIAGNOSIS — Z87.891 PERSONAL HISTORY OF NICOTINE DEPENDENCE: ICD-10-CM

## 2019-05-16 DIAGNOSIS — B34.8 OTHER VIRAL INFECTIONS OF UNSPECIFIED SITE: ICD-10-CM

## 2019-05-16 NOTE — DISCUSSION/SUMMARY
[Home] : patient was discharged to home [Other:_____] : [unfilled] [FreeTextEntry3] : messages left; no return call.

## 2019-07-24 ENCOUNTER — APPOINTMENT (OUTPATIENT)
Dept: INTERNAL MEDICINE | Facility: CLINIC | Age: 64
End: 2019-07-24
Payer: COMMERCIAL

## 2019-07-24 VITALS
HEART RATE: 84 BPM | OXYGEN SATURATION: 97 % | WEIGHT: 170 LBS | RESPIRATION RATE: 20 BRPM | SYSTOLIC BLOOD PRESSURE: 130 MMHG | TEMPERATURE: 97.9 F | HEIGHT: 70 IN | DIASTOLIC BLOOD PRESSURE: 92 MMHG | BODY MASS INDEX: 24.34 KG/M2

## 2019-07-24 DIAGNOSIS — N39.0 URINARY TRACT INFECTION, SITE NOT SPECIFIED: ICD-10-CM

## 2019-07-24 DIAGNOSIS — I10 ESSENTIAL (PRIMARY) HYPERTENSION: ICD-10-CM

## 2019-07-24 DIAGNOSIS — E03.9 HYPOTHYROIDISM, UNSPECIFIED: ICD-10-CM

## 2019-07-24 DIAGNOSIS — G35 MULTIPLE SCLEROSIS: ICD-10-CM

## 2019-07-24 DIAGNOSIS — R74.8 ABNORMAL LEVELS OF OTHER SERUM ENZYMES: ICD-10-CM

## 2019-07-24 PROCEDURE — 99214 OFFICE O/P EST MOD 30 MIN: CPT

## 2019-07-24 NOTE — HISTORY OF PRESENT ILLNESS
[FreeTextEntry1] : urinary tract infection [de-identified] : Mr. Summers presents for a followup evaluation. He is currently being treated for recurrent urinary tract infection. He has significant multiple sclerosis and is wheelchair-bound. He gets some shortness of breath with exertion. He was admitted to [facility] and 5/19 and treated for a right lower lobe pneumonia. Currently he is off of antibiotic therapy and does not use steroids either. He continues on current medication regimen.

## 2019-07-24 NOTE — PLAN
[FreeTextEntry1] : Mr. Summers has a history of multiple sclerosis. He will continue on current medication regimen. He has not had the pneumonia vaccine and the shingles vaccine. Patient states it is been told by his neurologist not to receive vaccines. However discuss this with her further.

## 2019-07-24 NOTE — PHYSICAL EXAM
[No Acute Distress] : no acute distress [Well Developed] : well developed [Well Nourished] : well nourished [Well-Appearing] : well-appearing [Normal Sclera/Conjunctiva] : normal sclera/conjunctiva [PERRL] : pupils equal round and reactive to light [EOMI] : extraocular movements intact [Normal Outer Ear/Nose] : the outer ears and nose were normal in appearance [Normal Oropharynx] : the oropharynx was normal [No Lymphadenopathy] : no lymphadenopathy [No JVD] : no jugular venous distention [Thyroid Normal, No Nodules] : the thyroid was normal and there were no nodules present [Supple] : supple [No Respiratory Distress] : no respiratory distress  [Normal Rate] : normal rate  [Clear to Auscultation] : lungs were clear to auscultation bilaterally [No Accessory Muscle Use] : no accessory muscle use [Regular Rhythm] : with a regular rhythm [Normal S1, S2] : normal S1 and S2 [No Murmur] : no murmur heard [No Carotid Bruits] : no carotid bruits [Pedal Pulses Present] : the pedal pulses are present [No Varicosities] : no varicosities [No Abdominal Bruit] : a ~M bruit was not heard ~T in the abdomen [No Palpable Aorta] : no palpable aorta [No Edema] : there was no peripheral edema [No Extremity Clubbing/Cyanosis] : no extremity clubbing/cyanosis [Non Tender] : non-tender [Soft] : abdomen soft [No HSM] : no HSM [Non-distended] : non-distended [No Masses] : no abdominal mass palpated [Normal Anterior Cervical Nodes] : no anterior cervical lymphadenopathy [Normal Posterior Cervical Nodes] : no posterior cervical lymphadenopathy [Normal Bowel Sounds] : normal bowel sounds [No Spinal Tenderness] : no spinal tenderness [No CVA Tenderness] : no CVA  tenderness [No Joint Swelling] : no joint swelling [Grossly Normal Strength/Tone] : grossly normal strength/tone [No Rash] : no rash [No Focal Deficits] : no focal deficits [Normal Affect] : the affect was normal [Normal Gait] : normal gait [Normal Insight/Judgement] : insight and judgment were intact

## 2019-08-18 ENCOUNTER — RX RENEWAL (OUTPATIENT)
Age: 64
End: 2019-08-18

## 2019-12-13 ENCOUNTER — MEDICATION RENEWAL (OUTPATIENT)
Age: 64
End: 2019-12-13

## 2019-12-16 ENCOUNTER — RX RENEWAL (OUTPATIENT)
Age: 64
End: 2019-12-16

## 2019-12-16 RX ORDER — METOPROLOL SUCCINATE 25 MG/1
25 TABLET, EXTENDED RELEASE ORAL
Qty: 30 | Refills: 1 | Status: ACTIVE | COMMUNITY
Start: 2019-08-18 | End: 1900-01-01

## 2019-12-26 ENCOUNTER — APPOINTMENT (OUTPATIENT)
Dept: INTERNAL MEDICINE | Facility: CLINIC | Age: 64
End: 2019-12-26

## 2020-02-05 RX ORDER — LEVOTHYROXINE SODIUM 0.07 MG/1
75 TABLET ORAL
Qty: 90 | Refills: 2 | Status: ACTIVE | COMMUNITY
Start: 2019-02-12 | End: 1900-01-01

## 2021-08-02 ENCOUNTER — TRANSCRIPTION ENCOUNTER (OUTPATIENT)
Age: 66
End: 2021-08-02

## 2021-10-06 PROBLEM — I10 ESSENTIAL HYPERTENSION: Status: ACTIVE | Noted: 2019-04-15

## 2022-06-24 ENCOUNTER — INPATIENT (INPATIENT)
Facility: HOSPITAL | Age: 67
LOS: 4 days | Discharge: HOME | DRG: 871 | End: 2022-06-29
Attending: HOSPITALIST | Admitting: INTERNAL MEDICINE
Payer: MEDICARE

## 2022-06-24 VITALS
RESPIRATION RATE: 18 BRPM | HEIGHT: 70 IN | TEMPERATURE: 99 F | SYSTOLIC BLOOD PRESSURE: 147 MMHG | HEART RATE: 115 BPM | OXYGEN SATURATION: 97 % | DIASTOLIC BLOOD PRESSURE: 95 MMHG

## 2022-06-24 DIAGNOSIS — N39.0 URINARY TRACT INFECTION, SITE NOT SPECIFIED: ICD-10-CM

## 2022-06-24 LAB
ALBUMIN SERPL ELPH-MCNC: 3.8 G/DL — SIGNIFICANT CHANGE UP (ref 3.3–5)
ALP SERPL-CCNC: 141 U/L — HIGH (ref 40–120)
ALT FLD-CCNC: 61 U/L — SIGNIFICANT CHANGE UP (ref 12–78)
ANION GAP SERPL CALC-SCNC: 9 MMOL/L — SIGNIFICANT CHANGE UP (ref 5–17)
APPEARANCE UR: ABNORMAL
AST SERPL-CCNC: 161 U/L — HIGH (ref 15–37)
BASOPHILS # BLD AUTO: 0.05 K/UL — SIGNIFICANT CHANGE UP (ref 0–0.2)
BASOPHILS NFR BLD AUTO: 0.3 % — SIGNIFICANT CHANGE UP (ref 0–2)
BILIRUB SERPL-MCNC: 1.1 MG/DL — SIGNIFICANT CHANGE UP (ref 0.2–1.2)
BILIRUB UR-MCNC: NEGATIVE — SIGNIFICANT CHANGE UP
BUN SERPL-MCNC: 14 MG/DL — SIGNIFICANT CHANGE UP (ref 7–23)
CALCIUM SERPL-MCNC: 9.6 MG/DL — SIGNIFICANT CHANGE UP (ref 8.5–10.1)
CHLORIDE SERPL-SCNC: 106 MMOL/L — SIGNIFICANT CHANGE UP (ref 96–108)
CK SERPL-CCNC: 8390 U/L — HIGH (ref 26–308)
CO2 SERPL-SCNC: 25 MMOL/L — SIGNIFICANT CHANGE UP (ref 22–31)
COLOR SPEC: ABNORMAL
CREAT SERPL-MCNC: 1.18 MG/DL — SIGNIFICANT CHANGE UP (ref 0.5–1.3)
DIFF PNL FLD: ABNORMAL
EGFR: 67 ML/MIN/1.73M2 — SIGNIFICANT CHANGE UP
EOSINOPHIL # BLD AUTO: 0.03 K/UL — SIGNIFICANT CHANGE UP (ref 0–0.5)
EOSINOPHIL NFR BLD AUTO: 0.2 % — SIGNIFICANT CHANGE UP (ref 0–6)
GLUCOSE SERPL-MCNC: 178 MG/DL — HIGH (ref 70–99)
GLUCOSE UR QL: NEGATIVE — SIGNIFICANT CHANGE UP
HCT VFR BLD CALC: 51.3 % — HIGH (ref 39–50)
HGB BLD-MCNC: 17.9 G/DL — HIGH (ref 13–17)
IMM GRANULOCYTES NFR BLD AUTO: 0.4 % — SIGNIFICANT CHANGE UP (ref 0–1.5)
KETONES UR-MCNC: ABNORMAL
LACTATE SERPL-SCNC: 2.8 MMOL/L — HIGH (ref 0.7–2)
LEUKOCYTE ESTERASE UR-ACNC: ABNORMAL
LYMPHOCYTES # BLD AUTO: 11.3 % — LOW (ref 13–44)
LYMPHOCYTES # BLD AUTO: 2.06 K/UL — SIGNIFICANT CHANGE UP (ref 1–3.3)
MCHC RBC-ENTMCNC: 31.6 PG — SIGNIFICANT CHANGE UP (ref 27–34)
MCHC RBC-ENTMCNC: 34.9 GM/DL — SIGNIFICANT CHANGE UP (ref 32–36)
MCV RBC AUTO: 90.6 FL — SIGNIFICANT CHANGE UP (ref 80–100)
MONOCYTES # BLD AUTO: 1.88 K/UL — HIGH (ref 0–0.9)
MONOCYTES NFR BLD AUTO: 10.3 % — SIGNIFICANT CHANGE UP (ref 2–14)
NEUTROPHILS # BLD AUTO: 14.17 K/UL — HIGH (ref 1.8–7.4)
NEUTROPHILS NFR BLD AUTO: 77.5 % — HIGH (ref 43–77)
NITRITE UR-MCNC: NEGATIVE — SIGNIFICANT CHANGE UP
PH UR: 6 — SIGNIFICANT CHANGE UP (ref 5–8)
PLATELET # BLD AUTO: 351 K/UL — SIGNIFICANT CHANGE UP (ref 150–400)
POTASSIUM SERPL-MCNC: 4.5 MMOL/L — SIGNIFICANT CHANGE UP (ref 3.5–5.3)
POTASSIUM SERPL-SCNC: 4.5 MMOL/L — SIGNIFICANT CHANGE UP (ref 3.5–5.3)
PROT SERPL-MCNC: 8 GM/DL — SIGNIFICANT CHANGE UP (ref 6–8.3)
PROT UR-MCNC: 100
RAPID RVP RESULT: SIGNIFICANT CHANGE UP
RBC # BLD: 5.66 M/UL — SIGNIFICANT CHANGE UP (ref 4.2–5.8)
RBC # FLD: 12.5 % — SIGNIFICANT CHANGE UP (ref 10.3–14.5)
SARS-COV-2 RNA SPEC QL NAA+PROBE: SIGNIFICANT CHANGE UP
SODIUM SERPL-SCNC: 140 MMOL/L — SIGNIFICANT CHANGE UP (ref 135–145)
SP GR SPEC: 1.02 — SIGNIFICANT CHANGE UP (ref 1.01–1.02)
TROPONIN I, HIGH SENSITIVITY RESULT: 28.34 NG/L — SIGNIFICANT CHANGE UP
UROBILINOGEN FLD QL: NEGATIVE — SIGNIFICANT CHANGE UP
WBC # BLD: 18.27 K/UL — HIGH (ref 3.8–10.5)
WBC # FLD AUTO: 18.27 K/UL — HIGH (ref 3.8–10.5)

## 2022-06-24 PROCEDURE — 80053 COMPREHEN METABOLIC PANEL: CPT

## 2022-06-24 PROCEDURE — 81001 URINALYSIS AUTO W/SCOPE: CPT

## 2022-06-24 PROCEDURE — 99223 1ST HOSP IP/OBS HIGH 75: CPT | Mod: GC

## 2022-06-24 PROCEDURE — 92610 EVALUATE SWALLOWING FUNCTION: CPT | Mod: GN

## 2022-06-24 PROCEDURE — 70450 CT HEAD/BRAIN W/O DYE: CPT | Mod: 26,MA

## 2022-06-24 PROCEDURE — 83036 HEMOGLOBIN GLYCOSYLATED A1C: CPT

## 2022-06-24 PROCEDURE — 87476 LYME DIS DNA AMP PROBE: CPT

## 2022-06-24 PROCEDURE — 85025 COMPLETE CBC W/AUTO DIFF WBC: CPT

## 2022-06-24 PROCEDURE — 85379 FIBRIN DEGRADATION QUANT: CPT

## 2022-06-24 PROCEDURE — 85027 COMPLETE CBC AUTOMATED: CPT

## 2022-06-24 PROCEDURE — 72125 CT NECK SPINE W/O DYE: CPT | Mod: 26,MA

## 2022-06-24 PROCEDURE — 87798 DETECT AGENT NOS DNA AMP: CPT

## 2022-06-24 PROCEDURE — 82550 ASSAY OF CK (CPK): CPT

## 2022-06-24 PROCEDURE — 86618 LYME DISEASE ANTIBODY: CPT

## 2022-06-24 PROCEDURE — 71045 X-RAY EXAM CHEST 1 VIEW: CPT

## 2022-06-24 PROCEDURE — 87040 BLOOD CULTURE FOR BACTERIA: CPT

## 2022-06-24 PROCEDURE — U0005: CPT

## 2022-06-24 PROCEDURE — 99285 EMERGENCY DEPT VISIT HI MDM: CPT

## 2022-06-24 PROCEDURE — 93970 EXTREMITY STUDY: CPT

## 2022-06-24 PROCEDURE — 97530 THERAPEUTIC ACTIVITIES: CPT | Mod: GP

## 2022-06-24 PROCEDURE — 84443 ASSAY THYROID STIM HORMONE: CPT

## 2022-06-24 PROCEDURE — 97116 GAIT TRAINING THERAPY: CPT | Mod: GP

## 2022-06-24 PROCEDURE — 80048 BASIC METABOLIC PNL TOTAL CA: CPT

## 2022-06-24 PROCEDURE — 93010 ELECTROCARDIOGRAM REPORT: CPT

## 2022-06-24 PROCEDURE — U0003: CPT

## 2022-06-24 PROCEDURE — 84436 ASSAY OF TOTAL THYROXINE: CPT

## 2022-06-24 PROCEDURE — 0225U NFCT DS DNA&RNA 21 SARSCOV2: CPT

## 2022-06-24 PROCEDURE — 97162 PT EVAL MOD COMPLEX 30 MIN: CPT | Mod: GP

## 2022-06-24 PROCEDURE — 36415 COLL VENOUS BLD VENIPUNCTURE: CPT

## 2022-06-24 PROCEDURE — 71045 X-RAY EXAM CHEST 1 VIEW: CPT | Mod: 26

## 2022-06-24 PROCEDURE — 83605 ASSAY OF LACTIC ACID: CPT

## 2022-06-24 PROCEDURE — 86753 PROTOZOA ANTIBODY NOS: CPT

## 2022-06-24 RX ORDER — CEFTRIAXONE 500 MG/1
1000 INJECTION, POWDER, FOR SOLUTION INTRAMUSCULAR; INTRAVENOUS ONCE
Refills: 0 | Status: COMPLETED | OUTPATIENT
Start: 2022-06-24 | End: 2022-06-24

## 2022-06-24 RX ORDER — AMLODIPINE BESYLATE 2.5 MG/1
1 TABLET ORAL
Qty: 0 | Refills: 0 | DISCHARGE

## 2022-06-24 RX ORDER — MODAFINIL 200 MG/1
1 TABLET ORAL
Qty: 0 | Refills: 0 | DISCHARGE

## 2022-06-24 RX ORDER — ROSUVASTATIN CALCIUM 5 MG/1
1 TABLET ORAL
Qty: 0 | Refills: 0 | DISCHARGE

## 2022-06-24 RX ORDER — OMEGA-3 ACID ETHYL ESTERS 1 G
1 CAPSULE ORAL
Qty: 0 | Refills: 0 | DISCHARGE

## 2022-06-24 RX ORDER — SODIUM CHLORIDE 9 MG/ML
1000 INJECTION INTRAMUSCULAR; INTRAVENOUS; SUBCUTANEOUS ONCE
Refills: 0 | Status: COMPLETED | OUTPATIENT
Start: 2022-06-24 | End: 2022-06-24

## 2022-06-24 RX ORDER — CLONAZEPAM 1 MG
1 TABLET ORAL
Qty: 0 | Refills: 0 | DISCHARGE

## 2022-06-24 RX ORDER — CLONAZEPAM 1 MG
0 TABLET ORAL
Qty: 0 | Refills: 0 | DISCHARGE

## 2022-06-24 RX ORDER — METOPROLOL TARTRATE 50 MG
1 TABLET ORAL
Qty: 0 | Refills: 0 | DISCHARGE

## 2022-06-24 RX ORDER — CHOLECALCIFEROL (VITAMIN D3) 125 MCG
1 CAPSULE ORAL
Qty: 0 | Refills: 0 | DISCHARGE

## 2022-06-24 RX ORDER — LEVOTHYROXINE SODIUM 125 MCG
1 TABLET ORAL
Qty: 0 | Refills: 0 | DISCHARGE

## 2022-06-24 RX ORDER — ASPIRIN/CALCIUM CARB/MAGNESIUM 324 MG
1 TABLET ORAL
Qty: 0 | Refills: 0 | DISCHARGE

## 2022-06-24 RX ADMIN — SODIUM CHLORIDE 2000 MILLILITER(S): 9 INJECTION INTRAMUSCULAR; INTRAVENOUS; SUBCUTANEOUS at 20:43

## 2022-06-24 RX ADMIN — CEFTRIAXONE 100 MILLIGRAM(S): 500 INJECTION, POWDER, FOR SOLUTION INTRAMUSCULAR; INTRAVENOUS at 20:58

## 2022-06-24 RX ADMIN — SODIUM CHLORIDE 2000 MILLILITER(S): 9 INJECTION INTRAMUSCULAR; INTRAVENOUS; SUBCUTANEOUS at 22:31

## 2022-06-24 NOTE — H&P ADULT - NSICDXPASTMEDICALHX_GEN_ALL_CORE_FT
PAST MEDICAL HISTORY:  HTN (hypertension)     Hyperlipidemia     Hypothyroidism     Kidney calculus     Multiple sclerosis dx 1998 - tysabri iv infusion monthly    Prediabetes     Sleep apnea

## 2022-06-24 NOTE — H&P ADULT - ATTENDING COMMENTS
Pt is a pleasant 67 yo Male with PMHx of Multiple Sclerosis (dx  20 yrs ago) and wheel chair dependence, HTN, preDM, SATHISH, HLD, who was admitted w/ Rhabdomyolysis after being found on the ground at home.  Pt is also admitted w/dehydration, hematuria , sepsis, fever 101.1F, and possible UTI.  - cont IVF and antibiotics, follow cultures  - labs improving with hydration  - sepsis reassessment shows improving vitals  - DDimer, Dupplex to eval for DVT  - DVT proph: lovenox  - Full Code

## 2022-06-24 NOTE — H&P ADULT - NSHPREVIEWOFSYSTEMS_GEN_ALL_CORE
REVIEW OF SYSTEMS:  CONSTITUTIONAL: + weakness, no  fevers or chills  EYES/ENT: No visual changes;  No vertigo or throat pain   NECK: No pain or stiffness  RESPIRATORY: No cough, wheezing, hemoptysis; No shortness of breath  CARDIOVASCULAR: No chest pain or palpitations  GASTROINTESTINAL: No abdominal or epigastric pain. No nausea, vomiting, or hematemesis; No diarrhea or constipation. No melena or hematochezia.  GENITOURINARY: No dysuria, frequency or hematuria  NEUROLOGICAL: +weakness b/L LE  SKIN: No itching, rashes

## 2022-06-24 NOTE — ED PROVIDER NOTE - NS ED ROS FT
Constitutional: No fever or chills  Eyes: No visual changes  HEENT: No throat pain (+) right jaw soreness  CV: No chest pain  Resp: No SOB no cough  GI: No abd pain, nausea or vomiting  : No dysuria  MSK: No musculoskeletal pain  Skin: No rash  Neuro: No headache

## 2022-06-24 NOTE — ED ADULT TRIAGE NOTE - CHIEF COMPLAINT QUOTE
Pt presents to ED s/p fall 6/23 at noon off toilet. was unable to get up. son called EMS when he checked on pt tonight and called EMS. pt c/o R jaw pain. denies CP, abdominal pain. moves all extremities. takes 81mg ASA. hx MS. A&O x4. GCS 15. TA called 1933. MD Pabon aware. ekg in progress.

## 2022-06-24 NOTE — H&P ADULT - HISTORY OF PRESENT ILLNESS
67 yo M with PMHx of MS dx > 20 yrs ago, HTN, preDM, SATHISH, HLD was brought in after being found in his bathroom floor by his son earlier today. Pt does not ambulate 2/2 MS, uses power chair. He reports fell yesterday while transferring from toilet to chair and spent ~30 hrs in the floor before being found. He endorses R jaw soreness, denies LOC, dysuria, hematuria, or frequency, not sure if hit head. Pt lives alone, not fully independent in ADL's.     ED Course: T 98.7, , /95, SpO2 97% @ RA. EKG: sinus tachy. 2L IVF. 65 yo M with PMHx of Multiple Sclerosis (dx  20 yrs ago), HTN, preDM, SATHISH, HLD who was brought in after being found on his bathroom floor by his son earlier today. Pt does not ambulate due to his  Multiple Sclerosis.  He uses a power chair.   Pt reports he fell yesterday while transferring from the toilet to a chair and spent approximately 30 hrs on the floor before being found.   He endorses R jaw soreness, denies LOC, dysuria, hematuria, or frequency.  He is not sure if he hit his head.   Pt lives alone, he is not fully independent in ADL's.     ED Course: T 98.7, , /95, SpO2 97% @ RA. EKG: sinus tachy. 2L IVF.

## 2022-06-24 NOTE — ED PROVIDER NOTE - PHYSICAL EXAMINATION
Constitutional: NAD AAOx3  Eyes: PERRL, EOMI  Head: Normocephalic atraumatic  Mouth: Dry mucus membranes   Cardiac: regular rate   Resp: Lungs CTAB  GI: Abd s/nt/nd  Neuro: CN2-12 intact  Extremities: Intact distal pulses b/l, no calf tenderness, normal ROM b/l UE and LE   Skin: Multiple old appearing bruises Constitutional: NAD AAOx3  Eyes: PERRL, EOMI  Head: Normocephalic atraumatic  Mouth: Dry mucus membranes   Cardiac: regular rate   Resp: Lungs CTAB  GI: Abd s/nt/nd  Neuro: CN2-12 intact  Extremities: Intact distal pulses b/l, no calf tenderness, pt unable to move lower extremities at baseline  Skin: Multiple old appearing bruises that is scattered around b/l UE

## 2022-06-24 NOTE — H&P ADULT - NSHPLABSRESULTS_GEN_ALL_CORE
< from: CT Head No Cont (06.24.22 @ 20:33) >    CT HEAD:    There is no CT evidence of acute intracranial hemorrhage,  mass effect,   midline shift, or acute, large territorial infarct.  The ventricles and   sulci are prominent compatible with moderate generalized cerebral volume   loss. Patchy periventricular and subcortical white matter hypodensities   are nonspecific, although likely on the basis of chronic small vessel   ischemic disease. The basal cisterns are patent. There is a partially   empty sella.    Minimal left mastoid air cell effusions, the remaining mastoid air cells   and middle ear cavities are grossly clear. Minimal mucosal thickening   within bilateral ethmoid air cells, the remaining paranasal sinuses are   grossly clear.    The calvarium and skull base are intact. There is soft tissue swelling in   the right frontal scalp.    CT CERVICAL SPINE:    There is  preservation  of the cervical lordosis.  There is no evidence of an acute cervical spine fracture or traumatic   malalignment.  There is no suspicious lytic or blastic lesion.  The paraspinous soft tissues are unremarkable within limits of CT scan.    Degenerative changes:  Advanced multilevel degenerative changes, with endplate osteophytes, disc   osteophyte complexes, and bilateral uncovertebral and facet hypertrophy   with severe neural foraminal narrowing at several levels, as well as mild   and moderate central spinal canal stenosis.    Incidental findings:  Bullous emphysematous changes are seen in both lung apices.  Visualized soft tissues of neck are grossly unremarkable.    IMPRESSION:    CT HEAD: No acute intracranial hemorrhage, mass effect, or osseous   fracture. Chronic changes, as described.    CT CERVICAL SPINE: No acute cervical spine fracture or traumatic   malalignment. Multilevel cervical spondylosis.

## 2022-06-24 NOTE — ED PROVIDER NOTE - NSICDXPASTSURGICALHX_GEN_ALL_CORE_FT
PAST SURGICAL HISTORY:  H/O angioplasty 2011 chronic cerebrospinal vascular insufficiency - treatment for MS by Dr Trace López    H/O lithotripsy 2010    History of tonsillectomy

## 2022-06-24 NOTE — ED PROVIDER NOTE - OBJECTIVE STATEMENT
65 y/o male with a PMHx of sleep apnea, HLD, hypothyroidism, kidney calculus, MS presents to the ED s/p fall yesterday. Pt last balance while transferring from toilet to power-chair and fell. Pt fell yesterday but was found this afternoon by his son. Pt c/o right-side of jaw being sore. No LOC, no head injury. Denies any pain. Pt lives at home alone with occasional aide.  Dr. Henderson- Neurologist for MS

## 2022-06-24 NOTE — H&P ADULT - NSHPPHYSICALEXAM_GEN_ALL_CORE
Vital Signs Last 24 Hrs  T(C): 37.1 (24 Jun 2022 19:33), Max: 37.1 (24 Jun 2022 19:33)  T(F): 98.7 (24 Jun 2022 19:33), Max: 98.7 (24 Jun 2022 19:33)  HR: 115 (24 Jun 2022 19:33) (115 - 115)  BP: 147/95 (24 Jun 2022 19:33) (147/95 - 147/95)  BP(mean): --  RR: 18 (24 Jun 2022 19:33) (18 - 18)  SpO2: 97% (24 Jun 2022 19:33) (97% - 97%)    PHYSICAL EXAM:  GENERAL: NAD, lying in bed comfortably  HEAD:  Atraumatic, Normocephalic  EYES: Lateral nystagmus towards L.  conjunctiva and sclera clear  ENT: Moist mucous membranes  NECK: Supple, No JVD  CHEST/LUNG: Clear to auscultation bilaterally; No rales, rhonchi, wheezing, or rubs. Unlabored respirations  HEART: Tachycardic  No murmurs, rubs, or gallops  ABDOMEN: Bowel sounds present; Soft, Nontender, Nondistended. No hepatomegaly  EXTREMITIES:  2+ Peripheral Pulses, brisk capillary refill. No clubbing, cyanosis  NERVOUS SYSTEM:  Alert & Oriented X3, speech clear. No deficits   MSK: Full and equal strength b/L UE, unable to move b/L LE, sensation intact   SKIN: Excoriations L upper LE

## 2022-06-24 NOTE — PHARMACOTHERAPY INTERVENTION NOTE - COMMENTS
Medication reconciliation completed.  Reviewed Medication list and confirmed med allergies with patient; confirmed with Dr. First Medval.

## 2022-06-24 NOTE — ED PROVIDER NOTE - NSICDXPASTMEDICALHX_GEN_ALL_CORE_FT
PAST MEDICAL HISTORY:  Hyperlipidemia     Hypothyroidism     Kidney calculus     Multiple sclerosis dx 1998 - tysabri iv infusion monthly    Sleep apnea

## 2022-06-24 NOTE — H&P ADULT - ASSESSMENT
65 yo M with PMHx of MS dx > 20 yrs ago, HTN, preDM, SATHISH, HLD was brought in after being found in his bathroom floor by his son earlier today. Pt does not ambulate 2/2 MS, uses power chair. He reports fell yesterday while transferring from toilet to chair and spent ~30 hrs in the floor before being found. He endorses R jaw soreness, denies LOC, dysuria, hematuria, or frequency, not sure if hit head. Pt lives alone, not fully independent in ADL's.     ADMIT TO MED/SURG     #S/P Fall with subsequent prolonged immobilization  #Rhabdomyolysis  -CPK 8390  -s/p 2L IVF in ED   -cont NS at 100 cc/hr x 12 hrs   -PT consult   -SW consult     #Lactic Acidosis  -secondary to over production from ischemic muscle   -pt on Metformin at home as well  -SIRS 1/4, sepsis unlikely     #Leukocytosis  #Erythrocytosis  -reactive from above, dehydration component as well  -cont IVFs  -CBC in AM     #pre DM  -on metformin 500 qD  -check A1C in AM    #Tachycardia  -EKG-sinus   -likely reactive   -cont to monitor     #Asymptomatic Bacteruria  -no urinary complaints, doubt need for abx     #MS  -cont modafinil 200 mg qD    #HTN  -cont amlodipine 5 mg     #Hypothyroidism   -cont levothyroxine 100 mcg qD    #HLD  -hold statin in the setting of rhabdo     #DVT PPx   -Lovenox 40 mg qD    #GOC  -FULL CODE    d/w Dr Hannah           65 yo M with PMHx of MS dx > 20 yrs ago, HTN, preDM, SATHISH, HLD was brought in after being found in his bathroom floor by his son earlier today. Pt does not ambulate 2/2 MS, uses power chair. He reports fell yesterday while transferring from toilet to chair and spent ~30 hrs in the floor before being found. He endorses R jaw soreness, denies LOC, dysuria, hematuria, or frequency, not sure if hit head. Pt lives alone, not fully independent in ADL's.     ADMIT TO MED/SURG     #S/P Fall with subsequent prolonged immobilization  #Rhabdomyolysis  -CPK 8390  -s/p 2L IVF in ED   -cont NS at 100 cc/hr x 12 hrs   -CT Head and Cervical spine- no acute findings   -PT consult   -SW consult     #Lactic Acidosis  -secondary to over production from ischemic muscle   -pt on Metformin at home as well      #Leukocytosis  #Erythrocytosis  -reactive from above, dehydration component as well  -cont IVFs  -CBC in AM     #pre DM  -on metformin 500 qD  -check A1C in AM    #Tachycardia  -EKG-sinus   -likely reactive   -cont to monitor     #Asymptomatic Bacteruria  -no urinary complaints, doubt need for abx     #MS  -cont modafinil 200 mg qD    #HTN  -cont amlodipine 5 mg     #Hypothyroidism   -cont levothyroxine 100 mcg qD    #HLD  -hold statin in the setting of rhabdo     #DVT PPx   -Lovenox 40 mg qD    #GOC  -FULL CODE    d/w Dr Hannah           67 yo M with PMHx of Multiple Sclerosis (dx  20 yrs ago), HTN, preDM, SATHISH, HLD who was brought in after being found on his bathroom floor by his son earlier today. Pt does not ambulate due to his  Multiple Sclerosis.  He uses a power chair.   Pt reports he fell yesterday while transferring from the toilet to a chair and spent approximately 30 hrs on the floor before being found.   He endorses R jaw soreness, denies LOC, dysuria, hematuria, or frequency.  He is not sure if he hit his head.   Pt lives alone, he is not fully independent in ADL's.     ADMIT TO MED/SURG     #S/P Fall with subsequent prolonged immobilization  #Rhabdomyolysis  -CPK 8390  -s/p 2L IVF in ED   -cont NS at 100 cc/hr x 12 hrs   -CT Head and Cervical spine- no acute findings   -PT consult   -SW consult     #Lactic Acidosis  -secondary to over production from ischemic muscle   -pt on Metformin at home as well      #Leukocytosis  #Erythrocytosis  -reactive from above, dehydration component as well  -cont IVFs  -CBC in AM     #pre DM  -on metformin 500 qD  -check A1C in AM    #Tachycardia  -EKG-sinus   -likely reactive   -cont to monitor     #Asymptomatic Bacteruria  -no urinary complaints, doubt need for abx     #MS  -cont modafinil 200 mg qD    #HTN  -cont amlodipine 5 mg     #Hypothyroidism   -cont levothyroxine 100 mcg qD    #HLD  -hold statin in the setting of rhabdo     #DVT PPx   -Lovenox 40 mg qD    #GOC  -FULL CODE    d/w Dr Hannah           65 yo M with PMHx of Multiple Sclerosis (dx  20 yrs ago), HTN, preDM, SATHISH, HLD who was brought in after being found on his bathroom floor by his son earlier today. Pt does not ambulate due to his  Multiple Sclerosis.  He uses a power chair.   Pt reports he fell yesterday while transferring from the toilet to a chair and spent approximately 30 hrs on the floor before being found.   He endorses R jaw soreness, denies LOC, dysuria, hematuria, or frequency.  He is not sure if he hit his head.   Pt lives alone, he is not fully independent in ADL's.     ADMIT TO MED/SURG     #S/P Fall with subsequent prolonged immobilization  #Rhabdomyolysis  -CPK 8390  -s/p 2L IVF in ED   -1L additional bolus   -cont NS at 100 cc/hr x 12 hrs   -repeat BMP, CPK now   -CT Head and Cervical spine- no acute findings   -PT consult   -SW consult     #Lactic Acidosis  -secondary to over production from ischemic muscle   -pt on Metformin at home as well  -repeat lactate       #Leukocytosis  #Erythrocytosis  -reactive from above, dehydration component as well  -cont IVFs  -CBC in AM     #pre DM  -on metformin 500 qD  -check A1C in AM    #Tachycardia  -EKG-sinus   -DDx: reactive vs dehydration vs benzo withdrawal (on clonazepam prn at home) vs thyroiditis vs sepsis   -TSH, T4 in AM   -cont IVF  -cont to monitor     #Asymptomatic Bacteruria  -no urinary complaints, doubt need for abx     #MS  -cont modafinil 200 mg qD    #HTN  -cont amlodipine 5 mg     #Hypothyroidism   -cont levothyroxine 100 mcg qD    #HLD  -hold statin in the setting of rhabdo     #DVT PPx   -Lovenox 40 mg qD    #GOC  -FULL CODE (agrees to chest compressions and intubation if needed)     d/w Dr Hannah           67 yo M with PMHx of Multiple Sclerosis (dx  20 yrs ago), HTN, preDM, SATHISH, HLD who was brought in after being found on his bathroom floor by his son earlier today. Pt does not ambulate due to his  Multiple Sclerosis.  He uses a power chair.   Pt reports he fell yesterday while transferring from the toilet to a chair and spent approximately 30 hrs on the floor before being found.   He endorses R jaw soreness, denies LOC, dysuria, hematuria, or frequency.  He is not sure if he hit his head.   Pt lives alone, he is not fully independent in ADL's.     ADMIT TO MED/SURG     #S/P Fall with subsequent prolonged immobilization  #Rhabdomyolysis  -CPK 8390  -s/p 2L IVF in ED   -1L additional bolus   -cont NS at 100 cc/hr x 12 hrs   -repeat BMP, CPK now   -CT Head and Cervical spine- no acute findings   -PT consult   -SW consult     #Severe Sepsis 2/2 UTI   -SIRS 3/4 (Leukocytosis, Tachycardia, Fever) with elevated lactate   -Hx of recurrent UTI's in the setting of MS  -Lactic acidosis also could be secondary to overproduction from ischemic muscle  -follow reflex lactate  -Ceftriaxone 1 gm q24  -follow BCx and UCX       #Leukocytosis  #Erythrocytosis  -reactive from above, dehydration component as well  -cont IVFs  -CBC in AM     #pre DM  -on metformin 500 qD  -check A1C in AM    #Tachycardia  -EKG-sinus   -DDx: sepsis  vs dehydration vs benzo withdrawal (on clonazepam prn at home) vs thyroiditis   -TSH, T4 in AM   -cont IVF  -cont to monitor     #MS  -cont modafinil 200 mg qD    #HTN  -cont amlodipine 5 mg     #Hypothyroidism   -cont levothyroxine 100 mcg qD    #HLD  -hold statin in the setting of rhabdo     #DVT PPx   -Lovenox 40 mg qD    #GOC  -FULL CODE (agrees to chest compressions and intubation if needed)     d/w Dr Hannah           67 yo M with PMHx of Multiple Sclerosis (dx  20 yrs ago), HTN, preDM, SATHISH, HLD who was brought in after being found on his bathroom floor by his son earlier today. Pt does not ambulate due to his  Multiple Sclerosis.  He uses a power chair.   Pt reports he fell yesterday while transferring from the toilet to a chair and spent approximately 30 hrs on the floor before being found.   He endorses R jaw soreness, denies LOC, dysuria, hematuria, or frequency.  He is not sure if he hit his head.   Pt lives alone, he is not fully independent in ADL's.     ADMIT TO MED/SURG     #S/P Fall with subsequent prolonged immobilization  #Rhabdomyolysis  -CPK 8390  -s/p 2L IVF in ED   -1L additional bolus   -cont NS at 100 cc/hr x 12 hrs   -repeat BMP, CPK now   -CT Head and Cervical spine- no acute findings   -PT consult   -SW consult     #Severe Sepsis 2/2 UTI   -SIRS 3/4 (Leukocytosis, Tachycardia, Fever) with elevated lactate   -Hx of recurrent UTI's in the setting of MS  -Lactic acidosis also could be secondary to overproduction from ischemic muscle  -follow reflex lactate  -Ceftriaxone 1 gm q24  -follow BCx and UCX   -ID consult       #Leukocytosis  #Erythrocytosis  -reactive from above, dehydration component as well  -cont IVFs  -CBC in AM     #pre DM  -on metformin 500 qD  -check A1C in AM    #Tachycardia  -EKG-sinus   -DDx: sepsis  vs dehydration vs benzo withdrawal (on clonazepam prn at home) vs thyroiditis vs DVT   -TSH, T4 in AM   -follow D-dimer, venous doppler b/L   -cont IVF  -cont to monitor     #MS  -cont modafinil 200 mg qD    #HTN  -cont amlodipine 5 mg     #Hypothyroidism   -cont levothyroxine 100 mcg qD    #HLD  -hold statin in the setting of rhabdo     #DVT PPx   -Lovenox 40 mg qD    #GOC  -FULL CODE (agrees to chest compressions and intubation if needed)     d/w Dr Hannah

## 2022-06-24 NOTE — ED PROVIDER NOTE - CLINICAL SUMMARY MEDICAL DECISION MAKING FREE TEXT BOX
EKG is sinus tachy at a rate of 114, non specific t wave changes, EKG is sinus tachy at a rate of 114, non specific t wave changes, pt's work up here revealed uti, elevated wbc, elevated lactate, d/w medicine and will be admitted for further evaluation and care.

## 2022-06-25 LAB
A1C WITH ESTIMATED AVERAGE GLUCOSE RESULT: 6.4 % — HIGH (ref 4–5.6)
ANION GAP SERPL CALC-SCNC: 6 MMOL/L — SIGNIFICANT CHANGE UP (ref 5–17)
ANION GAP SERPL CALC-SCNC: 7 MMOL/L — SIGNIFICANT CHANGE UP (ref 5–17)
APPEARANCE UR: ABNORMAL
BILIRUB UR-MCNC: NEGATIVE — SIGNIFICANT CHANGE UP
BUN SERPL-MCNC: 12 MG/DL — SIGNIFICANT CHANGE UP (ref 7–23)
BUN SERPL-MCNC: 13 MG/DL — SIGNIFICANT CHANGE UP (ref 7–23)
CALCIUM SERPL-MCNC: 8.6 MG/DL — SIGNIFICANT CHANGE UP (ref 8.5–10.1)
CALCIUM SERPL-MCNC: 8.6 MG/DL — SIGNIFICANT CHANGE UP (ref 8.5–10.1)
CHLORIDE SERPL-SCNC: 111 MMOL/L — HIGH (ref 96–108)
CHLORIDE SERPL-SCNC: 112 MMOL/L — HIGH (ref 96–108)
CK SERPL-CCNC: 5843 U/L — HIGH (ref 26–308)
CK SERPL-CCNC: 8061 U/L — HIGH (ref 26–308)
CO2 SERPL-SCNC: 25 MMOL/L — SIGNIFICANT CHANGE UP (ref 22–31)
CO2 SERPL-SCNC: 25 MMOL/L — SIGNIFICANT CHANGE UP (ref 22–31)
COLOR SPEC: YELLOW — SIGNIFICANT CHANGE UP
CREAT SERPL-MCNC: 1.02 MG/DL — SIGNIFICANT CHANGE UP (ref 0.5–1.3)
CREAT SERPL-MCNC: 1.14 MG/DL — SIGNIFICANT CHANGE UP (ref 0.5–1.3)
D DIMER BLD IA.RAPID-MCNC: 608 NG/ML DDU — HIGH
DIFF PNL FLD: ABNORMAL
EGFR: 70 ML/MIN/1.73M2 — SIGNIFICANT CHANGE UP
EGFR: 80 ML/MIN/1.73M2 — SIGNIFICANT CHANGE UP
ESTIMATED AVERAGE GLUCOSE: 137 MG/DL — HIGH (ref 68–114)
GLUCOSE SERPL-MCNC: 174 MG/DL — HIGH (ref 70–99)
GLUCOSE SERPL-MCNC: 183 MG/DL — HIGH (ref 70–99)
GLUCOSE UR QL: 1000 MG/DL
HCT VFR BLD CALC: 43.6 % — SIGNIFICANT CHANGE UP (ref 39–50)
HCT VFR BLD CALC: 46.8 % — SIGNIFICANT CHANGE UP (ref 39–50)
HGB BLD-MCNC: 15.2 G/DL — SIGNIFICANT CHANGE UP (ref 13–17)
HGB BLD-MCNC: 15.8 G/DL — SIGNIFICANT CHANGE UP (ref 13–17)
KETONES UR-MCNC: ABNORMAL
LACTATE SERPL-SCNC: 1.6 MMOL/L — SIGNIFICANT CHANGE UP (ref 0.7–2)
LACTATE SERPL-SCNC: 2 MMOL/L — SIGNIFICANT CHANGE UP (ref 0.7–2)
LACTATE SERPL-SCNC: 2.1 MMOL/L — HIGH (ref 0.7–2)
LEUKOCYTE ESTERASE UR-ACNC: ABNORMAL
MCHC RBC-ENTMCNC: 31 PG — SIGNIFICANT CHANGE UP (ref 27–34)
MCHC RBC-ENTMCNC: 31.8 PG — SIGNIFICANT CHANGE UP (ref 27–34)
MCHC RBC-ENTMCNC: 33.8 GM/DL — SIGNIFICANT CHANGE UP (ref 32–36)
MCHC RBC-ENTMCNC: 34.9 GM/DL — SIGNIFICANT CHANGE UP (ref 32–36)
MCV RBC AUTO: 91.2 FL — SIGNIFICANT CHANGE UP (ref 80–100)
MCV RBC AUTO: 91.9 FL — SIGNIFICANT CHANGE UP (ref 80–100)
NITRITE UR-MCNC: NEGATIVE — SIGNIFICANT CHANGE UP
PH UR: 5 — SIGNIFICANT CHANGE UP (ref 5–8)
PLATELET # BLD AUTO: 255 K/UL — SIGNIFICANT CHANGE UP (ref 150–400)
PLATELET # BLD AUTO: 282 K/UL — SIGNIFICANT CHANGE UP (ref 150–400)
POTASSIUM SERPL-MCNC: 3.9 MMOL/L — SIGNIFICANT CHANGE UP (ref 3.5–5.3)
POTASSIUM SERPL-MCNC: 4 MMOL/L — SIGNIFICANT CHANGE UP (ref 3.5–5.3)
POTASSIUM SERPL-SCNC: 3.9 MMOL/L — SIGNIFICANT CHANGE UP (ref 3.5–5.3)
POTASSIUM SERPL-SCNC: 4 MMOL/L — SIGNIFICANT CHANGE UP (ref 3.5–5.3)
PROT UR-MCNC: 30 MG/DL
RAPID RVP RESULT: SIGNIFICANT CHANGE UP
RBC # BLD: 4.78 M/UL — SIGNIFICANT CHANGE UP (ref 4.2–5.8)
RBC # BLD: 5.09 M/UL — SIGNIFICANT CHANGE UP (ref 4.2–5.8)
RBC # FLD: 12.8 % — SIGNIFICANT CHANGE UP (ref 10.3–14.5)
RBC # FLD: 12.8 % — SIGNIFICANT CHANGE UP (ref 10.3–14.5)
SARS-COV-2 RNA SPEC QL NAA+PROBE: SIGNIFICANT CHANGE UP
SODIUM SERPL-SCNC: 143 MMOL/L — SIGNIFICANT CHANGE UP (ref 135–145)
SODIUM SERPL-SCNC: 143 MMOL/L — SIGNIFICANT CHANGE UP (ref 135–145)
SP GR SPEC: 1.01 — SIGNIFICANT CHANGE UP (ref 1.01–1.02)
T4 AB SER-ACNC: 7.2 UG/DL — SIGNIFICANT CHANGE UP (ref 4.6–12)
TSH SERPL-MCNC: 7.19 UU/ML — HIGH (ref 0.34–4.82)
UROBILINOGEN FLD QL: NEGATIVE — SIGNIFICANT CHANGE UP
WBC # BLD: 13.28 K/UL — HIGH (ref 3.8–10.5)
WBC # BLD: 13.29 K/UL — HIGH (ref 3.8–10.5)
WBC # FLD AUTO: 13.28 K/UL — HIGH (ref 3.8–10.5)
WBC # FLD AUTO: 13.29 K/UL — HIGH (ref 3.8–10.5)

## 2022-06-25 PROCEDURE — 71045 X-RAY EXAM CHEST 1 VIEW: CPT | Mod: 26

## 2022-06-25 PROCEDURE — 93970 EXTREMITY STUDY: CPT | Mod: 26

## 2022-06-25 PROCEDURE — 99233 SBSQ HOSP IP/OBS HIGH 50: CPT | Mod: GC

## 2022-06-25 RX ORDER — SODIUM CHLORIDE 9 MG/ML
500 INJECTION INTRAMUSCULAR; INTRAVENOUS; SUBCUTANEOUS ONCE
Refills: 0 | Status: COMPLETED | OUTPATIENT
Start: 2022-06-25 | End: 2022-06-25

## 2022-06-25 RX ORDER — SODIUM CHLORIDE 9 MG/ML
1000 INJECTION INTRAMUSCULAR; INTRAVENOUS; SUBCUTANEOUS
Refills: 0 | Status: DISCONTINUED | OUTPATIENT
Start: 2022-06-25 | End: 2022-06-26

## 2022-06-25 RX ORDER — ACETAMINOPHEN 500 MG
650 TABLET ORAL EVERY 6 HOURS
Refills: 0 | Status: DISCONTINUED | OUTPATIENT
Start: 2022-06-25 | End: 2022-06-29

## 2022-06-25 RX ORDER — CLONAZEPAM 1 MG
1 TABLET ORAL THREE TIMES A DAY
Refills: 0 | Status: DISCONTINUED | OUTPATIENT
Start: 2022-06-25 | End: 2022-06-29

## 2022-06-25 RX ORDER — ACETAMINOPHEN 500 MG
1000 TABLET ORAL ONCE
Refills: 0 | Status: COMPLETED | OUTPATIENT
Start: 2022-06-25 | End: 2022-06-25

## 2022-06-25 RX ORDER — PIPERACILLIN AND TAZOBACTAM 4; .5 G/20ML; G/20ML
3.38 INJECTION, POWDER, LYOPHILIZED, FOR SOLUTION INTRAVENOUS ONCE
Refills: 0 | Status: COMPLETED | OUTPATIENT
Start: 2022-06-25 | End: 2022-06-25

## 2022-06-25 RX ORDER — PIPERACILLIN AND TAZOBACTAM 4; .5 G/20ML; G/20ML
3.38 INJECTION, POWDER, LYOPHILIZED, FOR SOLUTION INTRAVENOUS EVERY 8 HOURS
Refills: 0 | Status: DISCONTINUED | OUTPATIENT
Start: 2022-06-25 | End: 2022-06-29

## 2022-06-25 RX ORDER — IBUPROFEN 200 MG
400 TABLET ORAL EVERY 6 HOURS
Refills: 0 | Status: DISCONTINUED | OUTPATIENT
Start: 2022-06-25 | End: 2022-06-29

## 2022-06-25 RX ORDER — ENOXAPARIN SODIUM 100 MG/ML
40 INJECTION SUBCUTANEOUS EVERY 24 HOURS
Refills: 0 | Status: DISCONTINUED | OUTPATIENT
Start: 2022-06-25 | End: 2022-06-29

## 2022-06-25 RX ORDER — ONDANSETRON 8 MG/1
4 TABLET, FILM COATED ORAL EVERY 8 HOURS
Refills: 0 | Status: DISCONTINUED | OUTPATIENT
Start: 2022-06-25 | End: 2022-06-29

## 2022-06-25 RX ORDER — SODIUM CHLORIDE 9 MG/ML
50 INJECTION INTRAMUSCULAR; INTRAVENOUS; SUBCUTANEOUS ONCE
Refills: 0 | Status: DISCONTINUED | OUTPATIENT
Start: 2022-06-25 | End: 2022-06-25

## 2022-06-25 RX ORDER — LANOLIN ALCOHOL/MO/W.PET/CERES
3 CREAM (GRAM) TOPICAL AT BEDTIME
Refills: 0 | Status: DISCONTINUED | OUTPATIENT
Start: 2022-06-25 | End: 2022-06-29

## 2022-06-25 RX ORDER — LEVOTHYROXINE SODIUM 125 MCG
100 TABLET ORAL DAILY
Refills: 0 | Status: DISCONTINUED | OUTPATIENT
Start: 2022-06-25 | End: 2022-06-25

## 2022-06-25 RX ORDER — SODIUM CHLORIDE 9 MG/ML
1000 INJECTION INTRAMUSCULAR; INTRAVENOUS; SUBCUTANEOUS ONCE
Refills: 0 | Status: COMPLETED | OUTPATIENT
Start: 2022-06-25 | End: 2022-06-25

## 2022-06-25 RX ORDER — ASPIRIN/CALCIUM CARB/MAGNESIUM 324 MG
81 TABLET ORAL DAILY
Refills: 0 | Status: DISCONTINUED | OUTPATIENT
Start: 2022-06-25 | End: 2022-06-29

## 2022-06-25 RX ORDER — MODAFINIL 200 MG/1
200 TABLET ORAL DAILY
Refills: 0 | Status: DISCONTINUED | OUTPATIENT
Start: 2022-06-25 | End: 2022-06-29

## 2022-06-25 RX ORDER — CEFTRIAXONE 500 MG/1
1000 INJECTION, POWDER, FOR SOLUTION INTRAMUSCULAR; INTRAVENOUS EVERY 24 HOURS
Refills: 0 | Status: DISCONTINUED | OUTPATIENT
Start: 2022-06-25 | End: 2022-06-25

## 2022-06-25 RX ORDER — LEVOTHYROXINE SODIUM 125 MCG
112 TABLET ORAL DAILY
Refills: 0 | Status: DISCONTINUED | OUTPATIENT
Start: 2022-06-25 | End: 2022-06-29

## 2022-06-25 RX ORDER — AMLODIPINE BESYLATE 2.5 MG/1
5 TABLET ORAL DAILY
Refills: 0 | Status: DISCONTINUED | OUTPATIENT
Start: 2022-06-25 | End: 2022-06-29

## 2022-06-25 RX ORDER — ATORVASTATIN CALCIUM 80 MG/1
20 TABLET, FILM COATED ORAL AT BEDTIME
Refills: 0 | Status: DISCONTINUED | OUTPATIENT
Start: 2022-06-25 | End: 2022-06-25

## 2022-06-25 RX ORDER — IBUPROFEN 200 MG
400 TABLET ORAL ONCE
Refills: 0 | Status: DISCONTINUED | OUTPATIENT
Start: 2022-06-25 | End: 2022-06-25

## 2022-06-25 RX ADMIN — Medication 650 MILLIGRAM(S): at 02:30

## 2022-06-25 RX ADMIN — Medication 400 MILLIGRAM(S): at 17:36

## 2022-06-25 RX ADMIN — SODIUM CHLORIDE 500 MILLILITER(S): 9 INJECTION INTRAMUSCULAR; INTRAVENOUS; SUBCUTANEOUS at 15:33

## 2022-06-25 RX ADMIN — Medication 650 MILLIGRAM(S): at 11:30

## 2022-06-25 RX ADMIN — Medication 81 MILLIGRAM(S): at 11:15

## 2022-06-25 RX ADMIN — PIPERACILLIN AND TAZOBACTAM 200 GRAM(S): 4; .5 INJECTION, POWDER, LYOPHILIZED, FOR SOLUTION INTRAVENOUS at 15:33

## 2022-06-25 RX ADMIN — MODAFINIL 200 MILLIGRAM(S): 200 TABLET ORAL at 11:15

## 2022-06-25 RX ADMIN — Medication 1 TABLET(S): at 11:15

## 2022-06-25 RX ADMIN — ENOXAPARIN SODIUM 40 MILLIGRAM(S): 100 INJECTION SUBCUTANEOUS at 14:15

## 2022-06-25 RX ADMIN — Medication 100 MICROGRAM(S): at 07:39

## 2022-06-25 RX ADMIN — SODIUM CHLORIDE 1000 MILLILITER(S): 9 INJECTION INTRAMUSCULAR; INTRAVENOUS; SUBCUTANEOUS at 02:23

## 2022-06-25 RX ADMIN — SODIUM CHLORIDE 100 MILLILITER(S): 9 INJECTION INTRAMUSCULAR; INTRAVENOUS; SUBCUTANEOUS at 04:52

## 2022-06-25 RX ADMIN — AMLODIPINE BESYLATE 5 MILLIGRAM(S): 2.5 TABLET ORAL at 11:15

## 2022-06-25 RX ADMIN — PIPERACILLIN AND TAZOBACTAM 25 GRAM(S): 4; .5 INJECTION, POWDER, LYOPHILIZED, FOR SOLUTION INTRAVENOUS at 22:10

## 2022-06-25 RX ADMIN — Medication 400 MILLIGRAM(S): at 19:09

## 2022-06-25 NOTE — PROGRESS NOTE ADULT - ASSESSMENT
67 yo M with PMHx of Multiple Sclerosis (dx  20 yrs ago), HTN, preDM, SATHISH, HLD who was brought in after being found on his bathroom floor by his son earlier today. Pt does not ambulate due to his  Multiple Sclerosis.  He uses a power chair.   Pt reports he fell yesterday while transferring from the toilet to a chair and spent approximately 30 hrs on the floor before being found.   He endorses R jaw soreness, denies LOC, dysuria, hematuria, or frequency.  He is not sure if he hit his head.   Pt lives alone, he is not fully independent in ADL's.     #Rhabdomylosis 2/2 fall with subsequent prolonged immobilization  - CT Head and Cervical spine- no acute findings   - UA large blood, rbc 11-25  - CPK 8390>8061>5843, trending down  - S/p 2L IVF in ED, c/w NS at 100 cc/hr  - Continue to trend labs  - PT consult   - SW consult     #Severe Sepsis 2/2 UTI   - Pt meets sepsis criteria, with SIRS 3/4 (Leukocytosis, Tachycardia, Fever) with elevated lactate, urinary source  - Hx of recurrent UTI's in the setting of MS, UA + for LE, bacteria, WBC  - Lactic acidosis also could be secondary to overproduction from ischemic muscle  - Lactate 2.8 on admission, downtrending  - C/w Ceftriaxone 1 gm q24  - Follow BCx and UCX   -ID consult       #Leukocytosis  #Erythrocytosis  - Reactive from above, dehydration component as well  - C/w hydration  - Monitor CBC    #pre DM  - On metformin 500 qD at home  - F/u A1c    #MS  -cont modafinil 200 mg qD    #HTN  -cont amlodipine 5 mg     #Hypothyroidism   -cont levothyroxine 100 mcg qD    #HLD  -hold statin in the setting of rhabdo     #DVT PPx   -Lovenox 40 mg qD    #GOC  -FULL CODE (agrees to chest compressions and intubation if needed)     d/w Dr Lopez

## 2022-06-25 NOTE — CONSULT NOTE ADULT - ASSESSMENT
67 yo M with PMHx of Multiple Sclerosis (dx  20 yrs ago), HTN, preDM, SATHISH, HLD admitted on 6/24 for evaluation of being found on bathroom floor after falling while transferring from toilet to power chair; did stay on floor for over 30 hours, has multiple soft tissue bruises. History per medical record, as patient history is limited.     1. Patient admitted s/p fall, most likely has urinary tract infection, noted with pyuria and leukocytosis, also has rhabdomyolysis  - follow up cultures   - serial cbc and monitor temperature   - reviewed prior medical records to evaluate for resistant or atypical pathogens   - iv hydration and supportive care   - will continue ceftriaxone as ordered  - will need assistance for discharge as will need help at home if he should go home  2. other issues: per medicine

## 2022-06-25 NOTE — PROGRESS NOTE ADULT - SUBJECTIVE AND OBJECTIVE BOX
HPI:  67 yo M with PMHx of Multiple Sclerosis (dx  20 yrs ago), HTN, preDM, SATHISH, HLD who was brought in after being found on his bathroom floor by his son earlier today. Pt does not ambulate due to his  Multiple Sclerosis.  He uses a power chair.   Pt reports he fell yesterday while transferring from the toilet to a chair and spent approximately 30 hrs on the floor before being found.   He endorses R jaw soreness, denies LOC, dysuria, hematuria, or frequency.  He is not sure if he hit his head.   Pt lives alone, he is not fully independent in ADL's.   ED Course: T 98.7, , /95, SpO2 97% @ RA. EKG: sinus tachy. 2L IVF. (24 Jun 2022 23:49)    Subjective: Pt notes that he feels tired and weak, some pain in the upper left arm that he fell on, no other sxs or complaints at this time  Pt denies fevers, chills, ha, palpitations, SOB, cp.    GENERAL: NAD, lying in bed comfortably  HEAD:  Atraumatic, Normocephalic  EYES: Lateral nystagmus towards L.  conjunctiva and sclera clear  ENT: Moist mucous membranes  NECK: Supple, No JVD  CHEST/LUNG: Clear to auscultation bilaterally; No rales, rhonchi, wheezing, or rubs. Unlabored respirations  HEART: Tachycardic  No murmurs, rubs, or gallops  ABDOMEN: Bowel sounds present; Soft, Nontender, Nondistended. No hepatomegaly  EXTREMITIES:  2+ Peripheral Pulses, brisk capillary refill. No clubbing, cyanosis  NERVOUS SYSTEM:  Alert & Oriented X3, speech clear. No deficits   MSK: Full and equal strength b/L UE, unable to move b/L LE, sensation intact  SKIN: Excoriations L upper LE      Vital Signs Last 24 Hrs  T(C): 38.2 (25 Jun 2022 12:13), Max: 38.4 (25 Jun 2022 02:02)  T(F): 100.8 (25 Jun 2022 12:13), Max: 101.1 (25 Jun 2022 02:02)  HR: 112 (25 Jun 2022 11:50) (103 - 115)  BP: 144/64 (25 Jun 2022 11:50) (129/66 - 147/95)  BP(mean): --  RR: 20 (25 Jun 2022 11:50) (18 - 20)  SpO2: 96% (25 Jun 2022 11:50) (96% - 100%)    MEDICATIONS:  MEDICATIONS  (STANDING):  amLODIPine   Tablet 5 milliGRAM(s) Oral daily  aspirin enteric coated 81 milliGRAM(s) Oral daily  cefTRIAXone   IVPB 1000 milliGRAM(s) IV Intermittent every 24 hours  enoxaparin Injectable 40 milliGRAM(s) SubCutaneous every 24 hours  levothyroxine 100 MICROGram(s) Oral daily  modafinil 200 milliGRAM(s) Oral daily  multivitamin 1 Tablet(s) Oral daily  sodium chloride 0.9%. 1000 milliLiter(s) (100 mL/Hr) IV Continuous <Continuous>    MEDICATIONS  (PRN):  acetaminophen     Tablet .. 650 milliGRAM(s) Oral every 6 hours PRN Temp greater or equal to 38C (100.4F), Mild Pain (1 - 3)  aluminum hydroxide/magnesium hydroxide/simethicone Suspension 30 milliLiter(s) Oral every 4 hours PRN Dyspepsia  clonazePAM  Tablet 1 milliGRAM(s) Oral three times a day PRN anxiety  melatonin 3 milliGRAM(s) Oral at bedtime PRN Insomnia  ondansetron Injectable 4 milliGRAM(s) IV Push every 8 hours PRN Nausea and/or Vomiting      LABS: All Labs Reviewed:                        15.2   13.28 )-----------( 255      ( 25 Jun 2022 12:28 )             43.6     06-25    143  |  112<H>  |  12  ----------------------------<  174<H>  3.9   |  25  |  1.14    Ca    8.6      25 Jun 2022 08:13    TPro  8.0  /  Alb  3.8  /  TBili  1.1  /  DBili  x   /  AST  161<H>  /  ALT  61  /  AlkPhos  141<H>  06-24      CARDIAC MARKERS ( 25 Jun 2022 12:28 )  x     / x     / 5843 U/L / x     / x      CARDIAC MARKERS ( 25 Jun 2022 02:18 )  x     / x     / 8061 U/L / x     / x      CARDIAC MARKERS ( 24 Jun 2022 19:45 )  x     / x     / 8390 U/L / x     / x          Blood Culture:   I&O's Summary    25 Jun 2022 07:01  -  25 Jun 2022 14:02  --------------------------------------------------------  IN: 0 mL / OUT: 150 mL / NET: -150 mL      CAPILLARY BLOOD GLUCOSE

## 2022-06-25 NOTE — PATIENT PROFILE ADULT - FALL HARM RISK - RISK INTERVENTIONS

## 2022-06-25 NOTE — CHART NOTE - NSCHARTNOTEFT_GEN_A_CORE
Sepsis reassessment note  Capillary refill <2sec  Patient AOx3  Patient comfortable eating dinner  s/p IV fluids  Repeat Lactate pending  Continue Zosyn    Will continue to monitor

## 2022-06-25 NOTE — CONSULT NOTE ADULT - SUBJECTIVE AND OBJECTIVE BOX
Patient is a 66y old  Male who presents with a chief complaint of Rhabdomyolysis (2022 23:49)    HPI:  67 yo M with PMHx of Multiple Sclerosis (dx  20 yrs ago), HTN, preDM, SATHISH, HLD admitted on  for evaluation of being found on bathroom floor after falling while transferring from toilet to power chair; did stay on floor for over 30 hours, has multiple soft tissue bruises. History per medical record, as patient history is limited.         PMH: as above  PSH: as above  Meds: per reconciliation sheet, noted below  MEDICATIONS  (STANDING):  amLODIPine   Tablet 5 milliGRAM(s) Oral daily  aspirin enteric coated 81 milliGRAM(s) Oral daily  cefTRIAXone   IVPB 1000 milliGRAM(s) IV Intermittent every 24 hours  enoxaparin Injectable 40 milliGRAM(s) SubCutaneous every 24 hours  levothyroxine 100 MICROGram(s) Oral daily  modafinil 200 milliGRAM(s) Oral daily  multivitamin 1 Tablet(s) Oral daily  sodium chloride 0.9%. 1000 milliLiter(s) (100 mL/Hr) IV Continuous <Continuous>    MEDICATIONS  (PRN):  acetaminophen     Tablet .. 650 milliGRAM(s) Oral every 6 hours PRN Temp greater or equal to 38C (100.4F), Mild Pain (1 - 3)  aluminum hydroxide/magnesium hydroxide/simethicone Suspension 30 milliLiter(s) Oral every 4 hours PRN Dyspepsia  clonazePAM  Tablet 1 milliGRAM(s) Oral three times a day PRN anxiety  melatonin 3 milliGRAM(s) Oral at bedtime PRN Insomnia  ondansetron Injectable 4 milliGRAM(s) IV Push every 8 hours PRN Nausea and/or Vomiting    Allergies    No Known Allergies    Intolerances      Social: no smoking, no alcohol, no illegal drugs; no recent travel, no exposure to TB  FAMILY HISTORY: unable to obtain secondary to patient medical condition      ROS unable to obtain secondary to patient medical condition   Vital Signs Last 24 Hrs  T(C): 36.6 (2022 07:43), Max: 38.4 (2022 02:02)  T(F): 97.8 (2022 07:43), Max: 101.1 (2022 02:02)  HR: 103 (2022 07:43) (103 - 115)  BP: 144/75 (2022 07:43) (129/66 - 147/95)  BP(mean): --  RR: 20 (2022 07:43) (18 - 20)  SpO2: 100% (2022 07:43) (96% - 100%)  Daily Height in cm: 177.8 (2022 19:33)    Daily     PE:    Constitutional: frail looking  HEENT: NC/AT, EOMI, PERRLA, conjunctivae clear; ears and nose atraumatic; pharynx clear  Neck: supple; thyroid not palpable  Back: no tenderness  Respiratory: respiratory effort normal; clear to auscultation  Cardiovascular: S1S2 regular, no murmurs  Abdomen: soft, not tender, not distended, positive BS; no liver or spleen organomegaly  Genitourinary: no suprapubic tenderness  Musculoskeletal: no muscle tenderness, no joint swelling or tenderness  Neurological/ Psychiatric:  moving all extremities  Skin: no rashes; no palpable lesions    Labs: all available labs reviewed                        15.8   13.29 )-----------( 282      ( 2022 08:13 )             46.8     06-    143  |  112<H>  |  12  ----------------------------<  174<H>  3.9   |  25  |  1.14    Ca    8.6      2022 08:13    TPro  8.0  /  Alb  3.8  /  TBili  1.1  /  DBili  x   /  AST  161<H>  /  ALT  61  /  AlkPhos  141<H>  06-24     LIVER FUNCTIONS - ( 2022 19:45 )  Alb: 3.8 g/dL / Pro: 8.0 gm/dL / ALK PHOS: 141 U/L / ALT: 61 U/L / AST: 161 U/L / GGT: x           Urinalysis Basic - ( 2022 19:39 )    Color: Ema / Appearance: very cloudy / S.020 / pH: x  Gluc: x / Ketone: Moderate  / Bili: Negative / Urobili: Negative   Blood: x / Protein: 100 / Nitrite: Negative   Leuk Esterase: Moderate / RBC: 11-25 /HPF / WBC >50   Sq Epi: x / Non Sq Epi: Few / Bacteria: Many          Radiology: all available radiological tests reviewed    Advanced directives addressed: full resuscitation
ADMIT

## 2022-06-25 NOTE — ED ADULT NURSE REASSESSMENT NOTE - NS ED NURSE REASSESS COMMENT FT1
MD Grissom made aware of pt HR of 110. Rectal temp done and pt febrile to 101.1. labs being collected at this time. medication to be given.  no signs of distress noted. in pt.

## 2022-06-25 NOTE — CHART NOTE - NSCHARTNOTEFT_GEN_A_CORE
Called earlier for temperature of 100.8 with repeat being the same, along with tachycardia Pt w/MS had reported productive cough and had been lying supine for most of hospital stay.    PLAN  - CBC still shows elevated WBC, lactate elevated as well, with tachycardia and fever  - Stat CXR for suspicion of aspiration pneumonia  - IVF bolus  - Switched abx to IV Zosyn  - Will repeat lactate

## 2022-06-26 LAB
ALBUMIN SERPL ELPH-MCNC: 2.8 G/DL — LOW (ref 3.3–5)
ALP SERPL-CCNC: 91 U/L — SIGNIFICANT CHANGE UP (ref 40–120)
ALT FLD-CCNC: 44 U/L — SIGNIFICANT CHANGE UP (ref 12–78)
ANION GAP SERPL CALC-SCNC: 6 MMOL/L — SIGNIFICANT CHANGE UP (ref 5–17)
AST SERPL-CCNC: 61 U/L — HIGH (ref 15–37)
B BURGDOR C6 AB SER-ACNC: NEGATIVE — SIGNIFICANT CHANGE UP
B BURGDOR IGG+IGM SER QL IB: SIGNIFICANT CHANGE UP
B BURGDOR IGG+IGM SER-ACNC: 0.06 INDEX — SIGNIFICANT CHANGE UP (ref 0.01–0.89)
BASOPHILS # BLD AUTO: 0.07 K/UL — SIGNIFICANT CHANGE UP (ref 0–0.2)
BASOPHILS NFR BLD AUTO: 0.6 % — SIGNIFICANT CHANGE UP (ref 0–2)
BILIRUB SERPL-MCNC: 0.7 MG/DL — SIGNIFICANT CHANGE UP (ref 0.2–1.2)
BUN SERPL-MCNC: 13 MG/DL — SIGNIFICANT CHANGE UP (ref 7–23)
CALCIUM SERPL-MCNC: 8.7 MG/DL — SIGNIFICANT CHANGE UP (ref 8.5–10.1)
CHLORIDE SERPL-SCNC: 114 MMOL/L — HIGH (ref 96–108)
CK SERPL-CCNC: 2971 U/L — HIGH (ref 26–308)
CO2 SERPL-SCNC: 26 MMOL/L — SIGNIFICANT CHANGE UP (ref 22–31)
CREAT SERPL-MCNC: 1.04 MG/DL — SIGNIFICANT CHANGE UP (ref 0.5–1.3)
CULTURE RESULTS: SIGNIFICANT CHANGE UP
CULTURE RESULTS: SIGNIFICANT CHANGE UP
EGFR: 78 ML/MIN/1.73M2 — SIGNIFICANT CHANGE UP
EOSINOPHIL # BLD AUTO: 0.19 K/UL — SIGNIFICANT CHANGE UP (ref 0–0.5)
EOSINOPHIL NFR BLD AUTO: 1.6 % — SIGNIFICANT CHANGE UP (ref 0–6)
GLUCOSE SERPL-MCNC: 173 MG/DL — HIGH (ref 70–99)
HCT VFR BLD CALC: 42.2 % — SIGNIFICANT CHANGE UP (ref 39–50)
HGB BLD-MCNC: 14.1 G/DL — SIGNIFICANT CHANGE UP (ref 13–17)
IMM GRANULOCYTES NFR BLD AUTO: 0.6 % — SIGNIFICANT CHANGE UP (ref 0–1.5)
LACTATE SERPL-SCNC: 1.2 MMOL/L — SIGNIFICANT CHANGE UP (ref 0.7–2)
LYMPHOCYTES # BLD AUTO: 19.9 % — SIGNIFICANT CHANGE UP (ref 13–44)
LYMPHOCYTES # BLD AUTO: 2.43 K/UL — SIGNIFICANT CHANGE UP (ref 1–3.3)
MCHC RBC-ENTMCNC: 31.3 PG — SIGNIFICANT CHANGE UP (ref 27–34)
MCHC RBC-ENTMCNC: 33.4 GM/DL — SIGNIFICANT CHANGE UP (ref 32–36)
MCV RBC AUTO: 93.6 FL — SIGNIFICANT CHANGE UP (ref 80–100)
MONOCYTES # BLD AUTO: 1.2 K/UL — HIGH (ref 0–0.9)
MONOCYTES NFR BLD AUTO: 9.8 % — SIGNIFICANT CHANGE UP (ref 2–14)
NEUTROPHILS # BLD AUTO: 8.27 K/UL — HIGH (ref 1.8–7.4)
NEUTROPHILS NFR BLD AUTO: 67.5 % — SIGNIFICANT CHANGE UP (ref 43–77)
PLATELET # BLD AUTO: 247 K/UL — SIGNIFICANT CHANGE UP (ref 150–400)
POTASSIUM SERPL-MCNC: 3.3 MMOL/L — LOW (ref 3.5–5.3)
POTASSIUM SERPL-SCNC: 3.3 MMOL/L — LOW (ref 3.5–5.3)
PROT SERPL-MCNC: 6.2 GM/DL — SIGNIFICANT CHANGE UP (ref 6–8.3)
RBC # BLD: 4.51 M/UL — SIGNIFICANT CHANGE UP (ref 4.2–5.8)
RBC # FLD: 13.2 % — SIGNIFICANT CHANGE UP (ref 10.3–14.5)
SODIUM SERPL-SCNC: 146 MMOL/L — HIGH (ref 135–145)
SPECIMEN SOURCE: SIGNIFICANT CHANGE UP
SPECIMEN SOURCE: SIGNIFICANT CHANGE UP
WBC # BLD: 12.23 K/UL — HIGH (ref 3.8–10.5)
WBC # FLD AUTO: 12.23 K/UL — HIGH (ref 3.8–10.5)

## 2022-06-26 PROCEDURE — 99233 SBSQ HOSP IP/OBS HIGH 50: CPT | Mod: GC

## 2022-06-26 PROCEDURE — 71045 X-RAY EXAM CHEST 1 VIEW: CPT | Mod: 26

## 2022-06-26 RX ORDER — POTASSIUM CHLORIDE 20 MEQ
40 PACKET (EA) ORAL ONCE
Refills: 0 | Status: COMPLETED | OUTPATIENT
Start: 2022-06-26 | End: 2022-06-26

## 2022-06-26 RX ORDER — SODIUM CHLORIDE 9 MG/ML
1000 INJECTION, SOLUTION INTRAVENOUS
Refills: 0 | Status: DISCONTINUED | OUTPATIENT
Start: 2022-06-26 | End: 2022-06-27

## 2022-06-26 RX ADMIN — Medication 650 MILLIGRAM(S): at 11:30

## 2022-06-26 RX ADMIN — AMLODIPINE BESYLATE 5 MILLIGRAM(S): 2.5 TABLET ORAL at 09:51

## 2022-06-26 RX ADMIN — MODAFINIL 200 MILLIGRAM(S): 200 TABLET ORAL at 09:51

## 2022-06-26 RX ADMIN — PIPERACILLIN AND TAZOBACTAM 25 GRAM(S): 4; .5 INJECTION, POWDER, LYOPHILIZED, FOR SOLUTION INTRAVENOUS at 21:33

## 2022-06-26 RX ADMIN — Medication 1 MILLIGRAM(S): at 21:37

## 2022-06-26 RX ADMIN — Medication 40 MILLIEQUIVALENT(S): at 18:16

## 2022-06-26 RX ADMIN — PIPERACILLIN AND TAZOBACTAM 25 GRAM(S): 4; .5 INJECTION, POWDER, LYOPHILIZED, FOR SOLUTION INTRAVENOUS at 15:17

## 2022-06-26 RX ADMIN — Medication 81 MILLIGRAM(S): at 09:51

## 2022-06-26 RX ADMIN — Medication 112 MICROGRAM(S): at 05:51

## 2022-06-26 RX ADMIN — ENOXAPARIN SODIUM 40 MILLIGRAM(S): 100 INJECTION SUBCUTANEOUS at 15:17

## 2022-06-26 RX ADMIN — PIPERACILLIN AND TAZOBACTAM 25 GRAM(S): 4; .5 INJECTION, POWDER, LYOPHILIZED, FOR SOLUTION INTRAVENOUS at 05:50

## 2022-06-26 RX ADMIN — Medication 1 TABLET(S): at 09:51

## 2022-06-26 NOTE — PROGRESS NOTE ADULT - SUBJECTIVE AND OBJECTIVE BOX
Date of service: 06-26-22 @ 17:36      Patient lying in bed; looking more alert than admission, has no complaints, asking about his discharge, still with intermittent fevers      ROS unable to obtain secondary to patient medical condition     MEDICATIONS  (STANDING):  amLODIPine   Tablet 5 milliGRAM(s) Oral daily  aspirin enteric coated 81 milliGRAM(s) Oral daily  enoxaparin Injectable 40 milliGRAM(s) SubCutaneous every 24 hours  lactated ringers. 1000 milliLiter(s) (100 mL/Hr) IV Continuous <Continuous>  levothyroxine 112 MICROGram(s) Oral daily  modafinil 200 milliGRAM(s) Oral daily  multivitamin 1 Tablet(s) Oral daily  piperacillin/tazobactam IVPB.. 3.375 Gram(s) IV Intermittent every 8 hours  potassium chloride   Powder 40 milliEquivalent(s) Oral once    MEDICATIONS  (PRN):  acetaminophen     Tablet .. 650 milliGRAM(s) Oral every 6 hours PRN Temp greater or equal to 38C (100.4F), Mild Pain (1 - 3)  acetaminophen     Tablet .. 650 milliGRAM(s) Oral every 6 hours PRN Temp greater or equal to 38C (100.4F)  aluminum hydroxide/magnesium hydroxide/simethicone Suspension 30 milliLiter(s) Oral every 4 hours PRN Dyspepsia  clonazePAM  Tablet 1 milliGRAM(s) Oral three times a day PRN anxiety  ibuprofen  Tablet. 400 milliGRAM(s) Oral every 6 hours PRN Temp greater or equal to 38C (100.4F)  melatonin 3 milliGRAM(s) Oral at bedtime PRN Insomnia  ondansetron Injectable 4 milliGRAM(s) IV Push every 8 hours PRN Nausea and/or Vomiting      Vital Signs Last 24 Hrs  T(C): 37.9 (26 Jun 2022 14:29), Max: 38.4 (26 Jun 2022 11:30)  T(F): 100.3 (26 Jun 2022 14:29), Max: 101.1 (26 Jun 2022 11:30)  HR: 110 (26 Jun 2022 11:30) (98 - 110)  BP: 157/82 (26 Jun 2022 08:18) (111/63 - 157/82)  BP(mean): --  RR: 18 (26 Jun 2022 08:18) (18 - 20)  SpO2: 95% (26 Jun 2022 08:18) (95% - 95%)        Physical Exam:          Constitutional: frail looking  HEENT: NC/AT, EOMI, PERRLA, conjunctivae clear; ears and nose atraumatic; pharynx clear  Neck: supple; thyroid not palpable  Back: no tenderness  Respiratory: respiratory effort normal; clear to auscultation  Cardiovascular: S1S2 regular, no murmurs  Abdomen: soft, not tender, not distended, positive BS; no liver or spleen organomegaly  Genitourinary: no suprapubic tenderness  Musculoskeletal: no muscle tenderness, no joint swelling or tenderness  Neurological/ Psychiatric:  moving all extremities  Skin: no rashes; no palpable lesions    Labs: all available labs reviewed                      Labs:                        14.1   12.23 )-----------( 247      ( 26 Jun 2022 07:58 )             42.2     06-26    146<H>  |  114<H>  |  13  ----------------------------<  173<H>  3.3<L>   |  26  |  1.04    Ca    8.7      26 Jun 2022 07:58    TPro  6.2  /  Alb  2.8<L>  /  TBili  0.7  /  DBili  x   /  AST  61<H>  /  ALT  44  /  AlkPhos  91  06-26           Cultures:       Babesia microti PCR, Blood. (collected 06-25-22 @ 20:00)  Source: .Blood None  Final Report (06-26-22 @ 07:26):    Babesia microti PCR    Results: NOT detected    ***************Result Note*************    The detection of Babesia microti by PCR has only been    validated for whole blood; this test has not been approved    by the [place] Food and Drug Administration (FDA). Performance    characteristics of this assay have been determined by    [employer] The clinical significance    of results should be considered in conjunction with the    overall clinical presentation of the patient. Result is not    intended to be used as the sole means for clinical diagnosis    or patient management decisions.    One negative sample does not necessarily rule    out the presence of a parasitic infection.    Culture - Blood (collected 06-25-22 @ 02:19)  Source: .Blood None  Preliminary Report (06-26-22 @ 10:01):    No growth to date.    Culture - Blood (collected 06-25-22 @ 02:18)  Source: .Blood None  Preliminary Report (06-26-22 @ 10:01):    No growth to date.      D-Dimer Assay, Quantitative: 608 ng/mL DDU (06-25-22 @ 08:13)              Radiology: all available radiological tests reviewed    Advanced directives addressed: full resuscitation

## 2022-06-26 NOTE — PROGRESS NOTE ADULT - ASSESSMENT
67 yo M with PMHx of Multiple Sclerosis (dx  20 yrs ago), HTN, preDM, SATHISH, HLD admitted on 6/24 for evaluation of being found on bathroom floor after falling while transferring from toilet to power chair; did stay on floor for over 30 hours, has multiple soft tissue bruises. History per medical record, as patient history is limited.     1. Patient admitted s/p fall, most likely has urinary tract infection, noted with pyuria and leukocytosis, also has rhabdomyolysis  - follow up cultures   - serial cbc and monitor temperature   - reviewed prior medical records to evaluate for resistant or atypical pathogens   - iv hydration and supportive care   - antibiotics changed to zosyn, which can continue  - tolerating antibiotics without rashes or side effects   - will need assistance for discharge as will need help at home if he should go home  2. other issues: per medicine

## 2022-06-26 NOTE — PROGRESS NOTE ADULT - ASSESSMENT
65 yo M with PMHx of Multiple Sclerosis (dx  20 yrs ago), HTN, preDM, SATHISH, HLD who was brought in after being found on his bathroom floor by his son earlier today. Pt does not ambulate due to his  Multiple Sclerosis.  He uses a power chair.   Pt reports he fell yesterday while transferring from the toilet to a chair and spent approximately 30 hrs on the floor before being found.   He endorses R jaw soreness, denies LOC, dysuria, hematuria, or frequency.  He is not sure if he hit his head.   Pt lives alone, he is not fully independent in ADL's.       #Severe Sepsis 2/2 UTI   - Pt still having fever and his HR is elevated   - Pt meets sepsis criteria, with SIRS 3/4 (Leukocytosis, Tachycardia, Fever) with elevated lactate, urinary source  - Hx of recurrent UTI's in the setting of MS, UA + for LE, bacteria, WBC  - Lactic acidosis also could be secondary to overproduction from ischemic muscle  - Lactate 2.8 on admission, downtrending  - C/w Ceftriaxone 1 gm q24  - Follow BCx and UCX   -ID consult     #Rhabdomyolysis 2/2 fall with subsequent prolonged immobilization  - CT Head and Cervical spine- no acute findings   - UA large blood, rbc 11-25  - CPK [id number], trending down  - S/p 2L IVF in ED, c/w NS at 100 cc/hr  - Continue to trend labs  - PT consult   - SW consult     #Leukocytosis  #Erythrocytosis  - Reactive from above, dehydration component as well  - C/w hydration  - Monitor CBC    #pre DM  - On metformin 500 qD at home  - F/u A1c    #MS  -cont modafinil 200 mg qD    #HTN  -cont amlodipine 5 mg     #Hypothyroidism   -cont levothyroxine 100 mcg qD    #HLD  -hold statin in the setting of rhabdo     #DVT PPx   -Lovenox 40 mg qD    #GOC  -FULL CODE (agrees to chest compressions and intubation if needed)     d/w Dr Lopez          65 yo M with PMHx of Multiple Sclerosis (dx  20 yrs ago), HTN, preDM, SATHISH, HLD who was brought in after being found on his bathroom floor by his son earlier today. Pt does not ambulate due to his  Multiple Sclerosis.  He uses a power chair.   Pt reports he fell yesterday while transferring from the toilet to a chair and spent approximately 30 hrs on the floor before being found.   He endorses R jaw soreness, denies LOC, dysuria, hematuria, or frequency.  He is not sure if he hit his head.   Pt lives alone, he is not fully independent in ADL's.       #Severe Sepsis 2/2 UTI   - Pt still having fever and his HR is elevated   - Pt meets sepsis criteria, with SIRS 3/4 (Leukocytosis, Tachycardia, Fever) with elevated lactate, urinary source  - Hx of recurrent UTI's in the setting of MS, UA + for LE, bacteria, WBC  - Lactic acidosis also could be secondary to overproduction from ischemic muscle  - Lactate 2.8 on admission, downtrending  - C/w zosyn   - Blood culture no growth to date   -ID consulted -Advised to continue present management     #Rhabdomyolysis 2/2 fall with subsequent prolonged immobilization  - CT Head and Cervical spine- no acute findings   - UA large blood, rbc 11-25  - CPK [id number], trending down  - S/p 2L IVF in ED, c/w LR at 100 cc/hr  - Continue to trend labs  - PT consult   - SW consult     #Leukocytosis  #Erythrocytosis  -Improving   - Reactive from above, dehydration component as well  - C/w hydration  - Monitor CBC    #pre DM  - On metformin 500 qD at home  - F/u A1c    #MS  -cont modafinil 200 mg qD    #HTN  -cont amlodipine 5 mg     #Hypothyroidism   -cont levothyroxine 100 mcg qD    #HLD  -hold statin in the setting of rhabdo     #DVT PPx   -Lovenox 40 mg qD    #GOC  -FULL CODE (agrees to chest compressions and intubation if needed)     d/w Dr Sebastian Dong

## 2022-06-26 NOTE — PROGRESS NOTE ADULT - SUBJECTIVE AND OBJECTIVE BOX
HPI:  65 yo M with PMHx of Multiple Sclerosis (dx  20 yrs ago), HTN, preDM, SATHISH, HLD who was brought in after being found on his bathroom floor by his son earlier today. Pt does not ambulate due to his  Multiple Sclerosis.  He uses a power chair.   Pt reports he fell yesterday while transferring from the toilet to a chair and spent approximately 30 hrs on the floor before being found.   He endorses R jaw soreness, denies LOC, dysuria, hematuria, or frequency.  He is not sure if he hit his head.   Pt lives alone, he is not fully independent in ADL's.   ED Course: T 98.7, , /95, SpO2 97% @ RA. EKG: sinus tachy. 2L IVF. (24 Jun 2022 23:49)    Subjective: Pt notes that he feels tired and weak, some pain in the upper left arm that he fell on, no other sxs or complaints at this time  Pt denies fevers, chills, ha, palpitations, SOB, cp.    GENERAL: NAD, lying in bed comfortably  HEAD:  Atraumatic, Normocephalic  EYES: Lateral nystagmus towards L.  conjunctiva and sclera clear  ENT: Moist mucous membranes  NECK: Supple, No JVD  CHEST/LUNG: Clear to auscultation bilaterally; No rales, rhonchi, wheezing, or rubs. Unlabored respirations  HEART: Tachycardic  No murmurs, rubs, or gallops  ABDOMEN: Bowel sounds present; Soft, Nontender, Nondistended. No hepatomegaly  EXTREMITIES:  2+ Peripheral Pulses, brisk capillary refill. No clubbing, cyanosis  NERVOUS SYSTEM:  Alert & Oriented X3, speech clear. No deficits   MSK: Full and equal strength b/L UE, unable to move b/L LE, sensation intact  SKIN: Excoriations L upper LE      Vital Signs Last 24 Hrs  T(C): 37.9 (26 Jun 2022 14:29), Max: 38.4 (26 Jun 2022 11:30)  T(F): 100.3 (26 Jun 2022 14:29), Max: 101.1 (26 Jun 2022 11:30)  HR: 110 (26 Jun 2022 11:30) (98 - 110)  BP: 157/82 (26 Jun 2022 08:18) (111/63 - 157/82)  BP(mean): --  RR: 18 (26 Jun 2022 08:18) (18 - 20)  SpO2: 95% (26 Jun 2022 08:18) (95% - 95%)    MEDICATIONS:  MEDICATIONS  (STANDING):  amLODIPine   Tablet 5 milliGRAM(s) Oral daily  aspirin enteric coated 81 milliGRAM(s) Oral daily  cefTRIAXone   IVPB 1000 milliGRAM(s) IV Intermittent every 24 hours  enoxaparin Injectable 40 milliGRAM(s) SubCutaneous every 24 hours  levothyroxine 100 MICROGram(s) Oral daily  modafinil 200 milliGRAM(s) Oral daily  multivitamin 1 Tablet(s) Oral daily  sodium chloride 0.9%. 1000 milliLiter(s) (100 mL/Hr) IV Continuous <Continuous>    MEDICATIONS  (PRN):  acetaminophen     Tablet .. 650 milliGRAM(s) Oral every 6 hours PRN Temp greater or equal to 38C (100.4F), Mild Pain (1 - 3)  aluminum hydroxide/magnesium hydroxide/simethicone Suspension 30 milliLiter(s) Oral every 4 hours PRN Dyspepsia  clonazePAM  Tablet 1 milliGRAM(s) Oral three times a day PRN anxiety  melatonin 3 milliGRAM(s) Oral at bedtime PRN Insomnia  ondansetron Injectable 4 milliGRAM(s) IV Push every 8 hours PRN Nausea and/or Vomiting      LABS: All Labs Reviewed:                                       14.1   12.23 )-----------( 247      ( 26 Jun 2022 07:58 )             42.2   06-26    146<H>  |  114<H>  |  13  ----------------------------<  173<H>  3.3<L>   |  26  |  1.04    Ca    8.7      26 Jun 2022 07:58    TPro  6.2  /  Alb  2.8<L>  /  TBili  0.7  /  DBili  x   /  AST  61<H>  /  ALT  44  /  AlkPhos  91  06-26      CARDIAC MARKERS ( 25 Jun 2022 12:28 )  x     / x     / 5843 U/L / x     / x      CARDIAC MARKERS ( 25 Jun 2022 02:18 )  x     / x     / 8061 U/L / x     / x      CARDIAC MARKERS ( 24 Jun 2022 19:45 )  x     / x     / 8390 U/L / x     / x          Blood Culture:   I&O's Summary    25 Jun 2022 07:01  -  25 Jun 2022 14:02  --------------------------------------------------------  IN: 0 mL / OUT: 150 mL / NET: -150 mL      CAPILLARY BLOOD GLUCOSE       HPI:  65 yo M with PMHx of Multiple Sclerosis (dx  20 yrs ago), HTN, preDM, SATHISH, HLD who was brought in after being found on his bathroom floor by his son earlier today. Pt does not ambulate due to his  Multiple Sclerosis.  He uses a power chair.   Pt reports he fell yesterday while transferring from the toilet to a chair and spent approximately 30 hrs on the floor before being found.   He endorses R jaw soreness, denies LOC, dysuria, hematuria, or frequency.  He is not sure if he hit his head.   Pt lives alone, he is not fully independent in ADL's.   ED Course: T 98.7, , /95, SpO2 97% @ RA. EKG: sinus tachy. 2L IVF. (24 Jun 2022 23:49)    06/26- Pt6 seen by his bedside, not in acute distress. Pt have no complaints at this time     GENERAL: NAD, lying in bed comfortably  HEAD:  Atraumatic, Normocephalic  EYES: Lateral nystagmus towards L.  conjunctiva and sclera clear  ENT: Moist mucous membranes  NECK: Supple, No JVD  CHEST/LUNG: Clear to auscultation bilaterally; No rales, rhonchi, wheezing, or rubs. Unlabored respirations  HEART: Tachycardic  No murmurs, rubs, or gallops  ABDOMEN: Bowel sounds present; Soft, Nontender, Nondistended. No hepatomegaly  EXTREMITIES:  2+ Peripheral Pulses, brisk capillary refill. No clubbing, cyanosis  NERVOUS SYSTEM:  Alert & Oriented X3, speech clear. No deficits   MSK: Full and equal strength b/L UE, unable to move b/L LE, sensation intact  SKIN: Excoriations L upper LE    Vital Signs Last 24 Hrs  T(C): 37.9 (26 Jun 2022 14:29), Max: 38.4 (26 Jun 2022 11:30)  T(F): 100.3 (26 Jun 2022 14:29), Max: 101.1 (26 Jun 2022 11:30)  HR: 110 (26 Jun 2022 11:30) (98 - 110)  BP: 157/82 (26 Jun 2022 08:18) (111/63 - 157/82)  BP(mean): --  RR: 18 (26 Jun 2022 08:18) (18 - 20)  SpO2: 95% (26 Jun 2022 08:18) (95% - 95%)      LABS: All Labs Reviewed:                                    14.1   12.23 )-----------( 247      ( 26 Jun 2022 07:58 )             42.2   06-26    146<H>  |  114<H>  |  13  ----------------------------<  173<H>  3.3<L>   |  26  |  1.04    Ca    8.7      26 Jun 2022 07:58    TPro  6.2  /  Alb  2.8<L>  /  TBili  0.7  /  DBili  x   /  AST  61<H>  /  ALT  44  /  AlkPhos  91  06-26      CARDIAC MARKERS ( 25 Jun 2022 12:28 )  x     / x     / 5843 U/L / x     / x      CARDIAC MARKERS ( 25 Jun 2022 02:18 )  x     / x     / 8061 U/L / x     / x      CARDIAC MARKERS ( 24 Jun 2022 19:45 )  x     / x     / 8390 U/L / x     / x          Blood Culture:   I&O's Summary    25 Jun 2022 07:01  -  25 Jun 2022 14:02  --------------------------------------------------------  IN: 0 mL / OUT: 150 mL / NET: -150 mL      CAPILLARY BLOOD GLUCOSE    MEDICATIONS  (STANDING):  amLODIPine   Tablet 5 milliGRAM(s) Oral daily  aspirin enteric coated 81 milliGRAM(s) Oral daily  enoxaparin Injectable 40 milliGRAM(s) SubCutaneous every 24 hours  lactated ringers. 1000 milliLiter(s) (100 mL/Hr) IV Continuous <Continuous>  levothyroxine 112 MICROGram(s) Oral daily  modafinil 200 milliGRAM(s) Oral daily  multivitamin 1 Tablet(s) Oral daily  piperacillin/tazobactam IVPB.. 3.375 Gram(s) IV Intermittent every 8 hours  potassium chloride   Powder 40 milliEquivalent(s) Oral once    MEDICATIONS  (PRN):  acetaminophen     Tablet .. 650 milliGRAM(s) Oral every 6 hours PRN Temp greater or equal to 38C (100.4F), Mild Pain (1 - 3)  acetaminophen     Tablet .. 650 milliGRAM(s) Oral every 6 hours PRN Temp greater or equal to 38C (100.4F)  aluminum hydroxide/magnesium hydroxide/simethicone Suspension 30 milliLiter(s) Oral every 4 hours PRN Dyspepsia  clonazePAM  Tablet 1 milliGRAM(s) Oral three times a day PRN anxiety  ibuprofen  Tablet. 400 milliGRAM(s) Oral every 6 hours PRN Temp greater or equal to 38C (100.4F)  melatonin 3 milliGRAM(s) Oral at bedtime PRN Insomnia  ondansetron Injectable 4 milliGRAM(s) IV Push every 8 hours PRN Nausea and/or Vomiting

## 2022-06-26 NOTE — PROGRESS NOTE ADULT - SUBJECTIVE AND OBJECTIVE BOX
Pt has been seen and examined with FP resident, resident supervised agree with a/p       Patient is a 66y old  Male who presents with a chief complaint of Rhabdomyolysis (25 Jun 2022 10:19)      HPI:  65 yo M with PMHx of Multiple Sclerosis (dx  20 yrs ago), HTN, preDM, SATHISH, HLD who was brought in after being found on his bathroom floor by his son      PHYSICAL EXAM:  Vital Signs Last 24 Hrs  T(C): 38.4 (26 Jun 2022 11:30), Max: 38.4 (26 Jun 2022 11:30)  T(F): 101.1 (26 Jun 2022 11:30), Max: 101.1 (26 Jun 2022 11:30)  HR: 110 (26 Jun 2022 11:30) (98 - 110)  BP: 157/82 (26 Jun 2022 08:18) (111/63 - 157/82)  BP(mean): --  RR: 18 (26 Jun 2022 08:18) (18 - 20)  SpO2: 95% (26 Jun 2022 08:18) (95% - 96%)  -rs-aeeb, cta  -cvs-s1s2 normal   -p/a-soft,bs+      A/P    #ct abx, supportive care     #dvt pr

## 2022-06-27 LAB
ALBUMIN SERPL ELPH-MCNC: 2.7 G/DL — LOW (ref 3.3–5)
ALP SERPL-CCNC: 91 U/L — SIGNIFICANT CHANGE UP (ref 40–120)
ALT FLD-CCNC: 39 U/L — SIGNIFICANT CHANGE UP (ref 12–78)
ANION GAP SERPL CALC-SCNC: 5 MMOL/L — SIGNIFICANT CHANGE UP (ref 5–17)
ANION GAP SERPL CALC-SCNC: 6 MMOL/L — SIGNIFICANT CHANGE UP (ref 5–17)
AST SERPL-CCNC: 41 U/L — HIGH (ref 15–37)
BASOPHILS # BLD AUTO: 0.06 K/UL — SIGNIFICANT CHANGE UP (ref 0–0.2)
BASOPHILS NFR BLD AUTO: 0.7 % — SIGNIFICANT CHANGE UP (ref 0–2)
BILIRUB SERPL-MCNC: 0.7 MG/DL — SIGNIFICANT CHANGE UP (ref 0.2–1.2)
BUN SERPL-MCNC: 13 MG/DL — SIGNIFICANT CHANGE UP (ref 7–23)
BUN SERPL-MCNC: 9 MG/DL — SIGNIFICANT CHANGE UP (ref 7–23)
CALCIUM SERPL-MCNC: 8.7 MG/DL — SIGNIFICANT CHANGE UP (ref 8.5–10.1)
CALCIUM SERPL-MCNC: 9.2 MG/DL — SIGNIFICANT CHANGE UP (ref 8.5–10.1)
CHLORIDE SERPL-SCNC: 107 MMOL/L — SIGNIFICANT CHANGE UP (ref 96–108)
CHLORIDE SERPL-SCNC: 109 MMOL/L — HIGH (ref 96–108)
CK SERPL-CCNC: 1435 U/L — HIGH (ref 26–308)
CO2 SERPL-SCNC: 28 MMOL/L — SIGNIFICANT CHANGE UP (ref 22–31)
CO2 SERPL-SCNC: 29 MMOL/L — SIGNIFICANT CHANGE UP (ref 22–31)
CREAT SERPL-MCNC: 0.96 MG/DL — SIGNIFICANT CHANGE UP (ref 0.5–1.3)
CREAT SERPL-MCNC: 1.1 MG/DL — SIGNIFICANT CHANGE UP (ref 0.5–1.3)
EGFR: 73 ML/MIN/1.73M2 — SIGNIFICANT CHANGE UP
EGFR: 86 ML/MIN/1.73M2 — SIGNIFICANT CHANGE UP
EOSINOPHIL # BLD AUTO: 0.17 K/UL — SIGNIFICANT CHANGE UP (ref 0–0.5)
EOSINOPHIL NFR BLD AUTO: 1.9 % — SIGNIFICANT CHANGE UP (ref 0–6)
GLUCOSE SERPL-MCNC: 187 MG/DL — HIGH (ref 70–99)
GLUCOSE SERPL-MCNC: 231 MG/DL — HIGH (ref 70–99)
HCT VFR BLD CALC: 41.2 % — SIGNIFICANT CHANGE UP (ref 39–50)
HGB BLD-MCNC: 13.8 G/DL — SIGNIFICANT CHANGE UP (ref 13–17)
IMM GRANULOCYTES NFR BLD AUTO: 0.4 % — SIGNIFICANT CHANGE UP (ref 0–1.5)
LYMPHOCYTES # BLD AUTO: 2.04 K/UL — SIGNIFICANT CHANGE UP (ref 1–3.3)
LYMPHOCYTES # BLD AUTO: 22.3 % — SIGNIFICANT CHANGE UP (ref 13–44)
MCHC RBC-ENTMCNC: 30.9 PG — SIGNIFICANT CHANGE UP (ref 27–34)
MCHC RBC-ENTMCNC: 33.5 GM/DL — SIGNIFICANT CHANGE UP (ref 32–36)
MCV RBC AUTO: 92.2 FL — SIGNIFICANT CHANGE UP (ref 80–100)
MONOCYTES # BLD AUTO: 0.72 K/UL — SIGNIFICANT CHANGE UP (ref 0–0.9)
MONOCYTES NFR BLD AUTO: 7.9 % — SIGNIFICANT CHANGE UP (ref 2–14)
NEUTROPHILS # BLD AUTO: 6.1 K/UL — SIGNIFICANT CHANGE UP (ref 1.8–7.4)
NEUTROPHILS NFR BLD AUTO: 66.8 % — SIGNIFICANT CHANGE UP (ref 43–77)
PLATELET # BLD AUTO: 255 K/UL — SIGNIFICANT CHANGE UP (ref 150–400)
POTASSIUM SERPL-MCNC: 3.6 MMOL/L — SIGNIFICANT CHANGE UP (ref 3.5–5.3)
POTASSIUM SERPL-MCNC: 4.1 MMOL/L — SIGNIFICANT CHANGE UP (ref 3.5–5.3)
POTASSIUM SERPL-SCNC: 3.6 MMOL/L — SIGNIFICANT CHANGE UP (ref 3.5–5.3)
POTASSIUM SERPL-SCNC: 4.1 MMOL/L — SIGNIFICANT CHANGE UP (ref 3.5–5.3)
PROT SERPL-MCNC: 6.5 GM/DL — SIGNIFICANT CHANGE UP (ref 6–8.3)
RBC # BLD: 4.47 M/UL — SIGNIFICANT CHANGE UP (ref 4.2–5.8)
RBC # FLD: 12.6 % — SIGNIFICANT CHANGE UP (ref 10.3–14.5)
SODIUM SERPL-SCNC: 141 MMOL/L — SIGNIFICANT CHANGE UP (ref 135–145)
SODIUM SERPL-SCNC: 143 MMOL/L — SIGNIFICANT CHANGE UP (ref 135–145)
WBC # BLD: 9.13 K/UL — SIGNIFICANT CHANGE UP (ref 3.8–10.5)
WBC # FLD AUTO: 9.13 K/UL — SIGNIFICANT CHANGE UP (ref 3.8–10.5)

## 2022-06-27 PROCEDURE — 99233 SBSQ HOSP IP/OBS HIGH 50: CPT | Mod: GC

## 2022-06-27 RX ORDER — SODIUM CHLORIDE 9 MG/ML
1000 INJECTION, SOLUTION INTRAVENOUS
Refills: 0 | Status: DISCONTINUED | OUTPATIENT
Start: 2022-06-27 | End: 2022-06-29

## 2022-06-27 RX ORDER — SODIUM CHLORIDE 9 MG/ML
1000 INJECTION INTRAMUSCULAR; INTRAVENOUS; SUBCUTANEOUS
Refills: 0 | Status: DISCONTINUED | OUTPATIENT
Start: 2022-06-27 | End: 2022-06-27

## 2022-06-27 RX ORDER — SODIUM CHLORIDE 9 MG/ML
1000 INJECTION, SOLUTION INTRAVENOUS
Refills: 0 | Status: DISCONTINUED | OUTPATIENT
Start: 2022-06-27 | End: 2022-06-27

## 2022-06-27 RX ADMIN — PIPERACILLIN AND TAZOBACTAM 25 GRAM(S): 4; .5 INJECTION, POWDER, LYOPHILIZED, FOR SOLUTION INTRAVENOUS at 06:28

## 2022-06-27 RX ADMIN — AMLODIPINE BESYLATE 5 MILLIGRAM(S): 2.5 TABLET ORAL at 11:28

## 2022-06-27 RX ADMIN — PIPERACILLIN AND TAZOBACTAM 25 GRAM(S): 4; .5 INJECTION, POWDER, LYOPHILIZED, FOR SOLUTION INTRAVENOUS at 21:54

## 2022-06-27 RX ADMIN — Medication 1 TABLET(S): at 11:28

## 2022-06-27 RX ADMIN — Medication 81 MILLIGRAM(S): at 11:29

## 2022-06-27 RX ADMIN — Medication 112 MICROGRAM(S): at 06:28

## 2022-06-27 RX ADMIN — PIPERACILLIN AND TAZOBACTAM 25 GRAM(S): 4; .5 INJECTION, POWDER, LYOPHILIZED, FOR SOLUTION INTRAVENOUS at 14:51

## 2022-06-27 RX ADMIN — SODIUM CHLORIDE 75 MILLILITER(S): 9 INJECTION, SOLUTION INTRAVENOUS at 11:29

## 2022-06-27 RX ADMIN — MODAFINIL 200 MILLIGRAM(S): 200 TABLET ORAL at 11:29

## 2022-06-27 RX ADMIN — Medication 1 MILLIGRAM(S): at 21:55

## 2022-06-27 RX ADMIN — ENOXAPARIN SODIUM 40 MILLIGRAM(S): 100 INJECTION SUBCUTANEOUS at 14:51

## 2022-06-27 NOTE — SWALLOW BEDSIDE ASSESSMENT ADULT - SWALLOW EVAL: DIAGNOSIS
mild oral-pharyngeal dysphagia in a setting of MS with co-morbidities. and recent fall with extended length of time lying on floor:  and now being treated  for pneumonia, presumed aspiration

## 2022-06-27 NOTE — PROGRESS NOTE ADULT - SUBJECTIVE AND OBJECTIVE BOX
Pt has been seen and examined with FP resident, resident supervised agree with a/p       Patient is a 66y old  Male who presents with a chief complaint of Rhabdomyolysis (26 Jun 2022 17:35)      PHYSICAL EXAM:  Vital Signs Last 24 Hrs  T(C): 36.4 (27 Jun 2022 07:43), Max: 38.4 (26 Jun 2022 11:30)  T(F): 97.6 (27 Jun 2022 07:43), Max: 101.1 (26 Jun 2022 11:30)  HR: 90 (27 Jun 2022 07:43) (90 - 110)  BP: 139/70 (27 Jun 2022 07:43) (139/70 - 164/67)  BP(mean): --  RR: 17 (27 Jun 2022 07:43) (17 - 18)  SpO2: 94% (27 Jun 2022 07:43) (93% - 94%)  -rs-aeeb, cta  -cvs-s1s2 normal   -p/a-soft,bs+      A/P    #change iv fluids to dextrose     #ct abx for aspiration pneumonia     #d/c plan

## 2022-06-27 NOTE — SWALLOW BEDSIDE ASSESSMENT ADULT - SWALLOW EVAL: RECOMMENDED FEEDING/EATING TECHNIQUES
allow for swallow between intakes/alternate food with liquid/crush medication (when feasible)/maintain upright posture during/after eating for 30 mins/no straws/position upright (90 degrees)/small sips/bites

## 2022-06-27 NOTE — SWALLOW BEDSIDE ASSESSMENT ADULT - PHARYNGEAL PHASE
Onset of pharyngeal swallow within timelt limits: Observable full excursion. No immediate  s/s aspiration.  Very late chesty cough after thins on one trial. nevertheless, pt appears tolerant of thin liquids,./Within functional limits

## 2022-06-27 NOTE — PHYSICAL THERAPY INITIAL EVALUATION ADULT - ADDITIONAL COMMENTS
Pt is non-ambulatory due to MS, uses a power wheel chair.  Pt has an aide that comes in 3 x wk to help with upkeep of his house.

## 2022-06-27 NOTE — SWALLOW BEDSIDE ASSESSMENT ADULT - SLP GENERAL OBSERVATIONS
semi recumbent in  bed, awake and alert, verbally interactive with no evidence of primary linguistic pathology: motor speech slow and mildly imprecise but no overt dysarthria. Pt oriented to procedure.

## 2022-06-27 NOTE — PHYSICAL THERAPY INITIAL EVALUATION ADULT - PLANNED THERAPY INTERVENTIONS, PT EVAL
Detail Level: Detailed Add [id number] Cpt? (Important Note: In 2017 The Use Of 05063 Is Being Tracked By Cms To Determine Future Global Period Reimbursement For Global Periods): yes balance training/bed mobility training/strengthening/transfer training

## 2022-06-27 NOTE — PROGRESS NOTE ADULT - ASSESSMENT
67 yo M with PMHx of Multiple Sclerosis (dx  20 yrs ago), HTN, preDM, SATHISH, HLD who was brought in after being found on his bathroom floor by his son earlier today. Pt does not ambulate due to his  Multiple Sclerosis.  He uses a power chair.   Pt reports he fell yesterday while transferring from the toilet to a chair and spent approximately 30 hrs on the floor before being found.   He endorses R jaw soreness, denies LOC, dysuria, hematuria, or frequency.  He is not sure if he hit his head.   Pt lives alone, he is not fully independent in ADL's.       #Severe Sepsis 2/2 UTI   - Pt still having fever and his HR is elevated   - Pt meets sepsis criteria, with SIRS 3/4 (Leukocytosis, Tachycardia, Fever) with elevated lactate, urinary source  - Hx of recurrent UTI's in the setting of MS, UA + for LE, bacteria, WBC  - Lactic acidosis also could be secondary to overproduction from ischemic muscle  - Lactate 2.8 on admission, downtrending  - C/w zosyn   - Blood culture no growth to date   - ID consulted -Advised to continue present management     #Rhabdomyolysis 2/2 fall with subsequent prolonged immobilization  - CT Head and Cervical spine- no acute findings   - UA large blood, rbc 11-25  - CPK [id number], trending down  - S/p 2L IVF in ED, c/w LR at 100 cc/hr  - Continue to trend labs  - PT consult   - SW consult     #Leukocytosis  #Erythrocytosis  -Improving   - Reactive from above, dehydration component as well  - C/w hydration  - Monitor CBC    #pre DM  - On metformin 500 qD at home  - F/u A1c    #MS  -cont modafinil 200 mg qD    #HTN  -cont amlodipine 5 mg     #Hypothyroidism   -cont levothyroxine 100 mcg qD    #HLD  -hold statin in the setting of rhabdo     #DVT PPx   -Lovenox 40 mg qD    #GOC  -FULL CODE (agrees to chest compressions and intubation if needed)     d/w Dr Sebastian Dong

## 2022-06-27 NOTE — SWALLOW BEDSIDE ASSESSMENT ADULT - SWALLOW EVAL: SECRETION MANAGEMENT
SOFIE Duarte  Attending, Orthopaedic Surgery  [facility] [address] [facility]        ORTHOPAEDIC FOOT AND ANKLE CLINIC VISIT     Assessment:     Encounter Diagnosis   Name Primary?  Closed nondisplaced fracture of cuboid of right foot, initial encounter               Plan:   · The patient verbalized understanding of exam findings and treatment plan  We engaged in the shared decision-making process and treatment options were discussed at length with the patient  Surgical and conservative management discussed today along with risks and benefits  · Magdaleno's MRI does not show a peroneal tendon tear, however she does have a cuboid nondisplaced fracture which does not seen chronic as it is acute  · She would require a low tide cam boot to offload her foot for 6 weeks  · Prescription for a low tide cam boot , compression stocking,  mg BID for DVT ppx, and PT was placed  · Work note was provided to remain out of work    · We will see the patient back in 6 weeks for to wean boot  Fracture / Dislocation Treatment    Date/Time: 5/20/2021 3:20 PM  Performed by: Amina Joe MD  Authorized by: Amina Joe MD     Patient Location:  Clinic  Other Assisting Provider: No    Verbal consent obtained?: Yes    Written consent obtained?: No    Risks and benefits: Risks, benefits and alternatives were discussed    Consent given by:  Patient  Patient states understanding of procedure being performed: Yes    Patient identity confirmed:  Verbally with patient  Injury location:  Foot  Location details:  Right foot  Injury type:  Fracture  Fracture type: cuboid    Neurovascular status: Neurovascularly intact    Distal perfusion: normal    Neurological function: normal    Range of motion: normal    Local anesthesia used?: No    Manipulation performed?: No    Immobilization:  Brace (low tide cam boot)  Neurovascular status: Neurovascularly intact    Distal perfusion: normal    Neurological function: normal    Range of motion: normal    Patient tolerance:  Patient tolerated the procedure well with no immediate complications          History of Present Illness:   Chief Complaint:   Chief Complaint   Patient presents with    Right Ankle - Pain     Ronal Szymanski is a 30 y o  female who is being seen for right ankle pain  Patient states she has had a broken her ankle a year and half ago  Patient was seen by Kalpesh Cook and was told that she had a severe sprain and was done physical therapy, Cam boot for 3-4 months  Patient stated that pain did not improve and an MRI was ordered demonstrating a nondisplaced cuboid fracture and avulsion fracture of the navicular  Pain is localized at anterolateral ankle with minimal radiating and described as sharp and severe  Patient denies numbness, tingling or radicular pain  Denies history of neuropathy  Patient does  smoke, does not have diabetes and does not take blood thinners  Patient denies family history of anesthesia complications and has not had any complications with anesthesia  Pain/symptom timing:  Worse during the day when active  Pain/symptom context:  Worse with activites and work  Pain/symptom modifying factors:  Rest makes better, activities make worse  Pain/symptom associated signs/symptoms: none    Prior treatment   · NSAIDsYes    · Injections No   · Bracing/Orthotics Yes   · Physical Therapy Yes     Orthopedic Surgical History:   See Below    Past Medical, Surgical and Social History:  Past Medical History:  has a past medical history of Asthma and Pre-eclampsia  Problem List: does not have any pertinent problems on file  Past Surgical History:  has a past surgical history that includes  section  Family History: family history includes Diabetes in her paternal grandmother; Heart failure in her paternal grandmother; Hypertension in her paternal grandmother; No Known Problems in her father and mother    Social History:  reports that she has been smoking  She has never used smokeless tobacco  She reports that she does not drink alcohol or use drugs  Current Medications: has a current medication list which includes the following prescription(s): albuterol, albuterol, aspirin, and ondansetron  Allergies: is allergic to chlorhexidine  Review of Systems:  General- denies fever/chills  HEENT- denies hearing loss or sore throat  Eyes- denies eye pain or visual disturbances, denies red eyes  Respiratory- denies cough or SOB  Cardio- denies chest pain or palpitations  GI- denies abdominal pain  Endocrine- denies urinary frequency  Urinary- denies pain with urination  Musculoskeletal- Negative except noted above  Skin- denies rashes or wounds  Neurological- denies dizziness or headache  Psychiatric- denies anxiety or difficulty concentrating    Physical Exam:   /94   Pulse 88   Temp 98 9 °F (37 2 °C)   Ht 5' 5" (1 651 m)   Wt (!) 145 kg (320 lb)   LMP  (LMP Unknown)   BMI 53 25 kg/m²   General/Constitutional: No apparent distress: well-nourished and well developed  Eyes: normal ocular motion  Cardio: RRR, Normal S1S2, No m/r/g  Lymphatic: No appreciable lymphadenopathy  Respiratory: Non-labored breathing, CTA b/l no w/c/r  Vascular: No edema, swelling or tenderness, except as noted in detailed exam   Integumentary: No impressive skin lesions present, except as noted in detailed exam   Neuro: No ataxia or tremors noted  Psych: Normal mood and affect, oriented to person, place and time  Appropriate affect  Musculoskeletal: Normal, except as noted in detailed exam and in HPI      Examination    Right    Gait Antalgic   Musculoskeletal Tender to palpation at anterolateral ankle    Skin Normal       Nails Normal    Range of Motion  10 degrees dorsiflexion, 30 degrees plantarflexion  Subtalar motion: limited to pain    Stability Stable    Muscle Strength 5/5 tibialis anterior  5/5 gastrocnemius-soleus  5/5 posterior tibialis  5/5 peroneal/eversion strength  5/5 EHL  5/5 FHL    Neurologic Normal    Sensation  Intact to light touch throughout sural, saphenous, superficial peroneal, deep peroneal and medial/lateral plantar nerve distributions  [place] 5 07 filament (10g) testing  deferred  Cardiovascular Brisk capillary refill < 2 seconds,intact DP and PT pulses    Special Tests None      Imaging Studies:   MRI Right foot demonstrates cuboid fracture at the CC joint  Reviewed by me personally  Javan Epps Lachman, MD  Foot & Ankle Surgery   Department of [address] [place] [address]      I personally performed the service  Javan Epps Lachman, MD baseline swallow with full excursion

## 2022-06-27 NOTE — PHYSICAL THERAPY INITIAL EVALUATION ADULT - PERTINENT HX OF CURRENT PROBLEM, REHAB EVAL
67 yo M with PMHx of Multiple Sclerosis (dx  20 yrs ago), HTN, preDM, SATHISH, HLD who was brought in after being found on his bathroom floor by his son earlier today. Pt does not ambulate due to his  Multiple Sclerosis.  He uses a power chair.   Pt reports he fell yesterday while transferring from the toilet to a chair and spent approximately 30 hrs on the floor before being found.

## 2022-06-27 NOTE — SWALLOW BEDSIDE ASSESSMENT ADULT - NS SPL SWALLOW CLINIC TRIAL FT
Maintain regular consistency diet. Suggest NO STRAW Meds crushed in puree.  PT upright.  disc with pt. drink in single small sips with reset breathing after swallow.

## 2022-06-27 NOTE — PROGRESS NOTE ADULT - SUBJECTIVE AND OBJECTIVE BOX
HPI:  65 yo M with PMHx of Multiple Sclerosis (dx  20 yrs ago), HTN, preDM, SATHISH, HLD who was brought in after being found on his bathroom floor by his son earlier today. Pt does not ambulate due to his  Multiple Sclerosis.  He uses a power chair.   Pt reports he fell yesterday while transferring from the toilet to a chair and spent approximately 30 hrs on the floor before being found.   He endorses R jaw soreness, denies LOC, dysuria, hematuria, or frequency.  He is not sure if he hit his head.   Pt lives alone, he is not fully independent in ADL's.   ED Course: T 98.7, , /95, SpO2 97% @ RA. EKG: sinus tachy. 2L IVF. (24 Jun 2022 23:49)    06/25/22: Pt notes that he feels tired and weak, some pain in the upper left arm that he fell on, no other sxs or complaints at this time  06/26/22: Pt seen by his bedside, not in acute distress. Pt have no complaints at this time   06/27/22: No overnight events. Pt feels better today. No complaints or sxs to report at this time.    Pt denies fevers, chills, ha, palpitations, SOB, cp. ROS as stated above    Vital Signs Last 24 Hrs  T(C): 36.8 (27 Jun 2022 15:17), Max: 36.9 (26 Jun 2022 23:40)  T(F): 98.2 (27 Jun 2022 15:17), Max: 98.4 (26 Jun 2022 23:40)  HR: 93 (27 Jun 2022 15:17) (90 - 99)  BP: 133/71 (27 Jun 2022 15:17) (133/71 - 164/67)  BP(mean): --  RR: 18 (27 Jun 2022 15:17) (17 - 18)  SpO2: 96% (27 Jun 2022 15:17) (93% - 96%)    GENERAL: NAD, lying in bed comfortably  HEAD:  Atraumatic, Normocephalic  EYES: Lateral nystagmus towards L.  conjunctiva and sclera clear  ENT: Moist mucous membranes  NECK: Supple, No JVD  CHEST/LUNG: Clear to auscultation bilaterally; No rales, rhonchi, wheezing, or rubs. Unlabored respirations  HEART: Tachycardic  No murmurs, rubs, or gallops  ABDOMEN: Bowel sounds present; Soft, Nontender, Nondistended. No hepatomegaly  EXTREMITIES:  2+ Peripheral Pulses, brisk capillary refill. No clubbing, cyanosis  NERVOUS SYSTEM:  Alert & Oriented X3, speech clear. No deficits   MSK: Full and equal strength b/L UE, unable to move b/L LE, sensation intact  SKIN: Excoriations L upper LE    MEDICATIONS:  MEDICATIONS  (STANDING):  amLODIPine   Tablet 5 milliGRAM(s) Oral daily  aspirin enteric coated 81 milliGRAM(s) Oral daily  enoxaparin Injectable 40 milliGRAM(s) SubCutaneous every 24 hours  lactated ringers. 1000 milliLiter(s) (100 mL/Hr) IV Continuous <Continuous>  levothyroxine 112 MICROGram(s) Oral daily  modafinil 200 milliGRAM(s) Oral daily  multivitamin 1 Tablet(s) Oral daily  piperacillin/tazobactam IVPB.. 3.375 Gram(s) IV Intermittent every 8 hours    MEDICATIONS  (PRN):  acetaminophen     Tablet .. 650 milliGRAM(s) Oral every 6 hours PRN Temp greater or equal to 38C (100.4F), Mild Pain (1 - 3)  acetaminophen     Tablet .. 650 milliGRAM(s) Oral every 6 hours PRN Temp greater or equal to 38C (100.4F)  aluminum hydroxide/magnesium hydroxide/simethicone Suspension 30 milliLiter(s) Oral every 4 hours PRN Dyspepsia  clonazePAM  Tablet 1 milliGRAM(s) Oral three times a day PRN anxiety  ibuprofen  Tablet. 400 milliGRAM(s) Oral every 6 hours PRN Temp greater or equal to 38C (100.4F)  melatonin 3 milliGRAM(s) Oral at bedtime PRN Insomnia  ondansetron Injectable 4 milliGRAM(s) IV Push every 8 hours PRN Nausea and/or Vomiting      LABS: All Labs Reviewed:                        13.8   9.13  )-----------( 255      ( 27 Jun 2022 10:17 )             41.2     06-27    141  |  107  |  9   ----------------------------<  231<H>  3.6   |  28  |  0.96    Ca    8.7      27 Jun 2022 10:17    TPro  6.5  /  Alb  2.7<L>  /  TBili  0.7  /  DBili  x   /  AST  41<H>  /  ALT  39  /  AlkPhos  91  06-27      CARDIAC MARKERS ( 27 Jun 2022 10:17 )  x     / x     / 1435 U/L / x     / x      CARDIAC MARKERS ( 26 Jun 2022 07:58 )  x     / x     / 2971 U/L / x     / x          Blood Culture: 06-25 @ 20:00  Organism --  Gram Stain Blood -- Gram Stain --  Specimen Source .Blood None  Culture-Blood --    06-25 @ 02:19  Organism --  Gram Stain Blood -- Gram Stain --  Specimen Source .Blood None  Culture-Blood --    06-25 @ 02:18  Organism --  Gram Stain Blood -- Gram Stain --  Specimen Source .Blood None  Culture-Blood --    06-24 @ 19:39  Organism --  Gram Stain Blood -- Gram Stain --  Specimen Source Clean Catch Clean Catch (Midstream)  Culture-Blood --      I&O's Summary    26 Jun 2022 07:01  -  27 Jun 2022 07:00  --------------------------------------------------------  IN: 0 mL / OUT: 1050 mL / NET: -1050 mL    27 Jun 2022 07:01  -  27 Jun 2022 16:16  --------------------------------------------------------  IN: 0 mL / OUT: 400 mL / NET: -400 mL      CAPILLARY BLOOD GLUCOSE    EKG/RADIOLOGY  < from: US Duplex Venous Lower Ext Complete, Bilateral (06.25.22 @ 04:44) >  FINDINGS:    RIGHT:  Normal compressibility of the RIGHT common femoral, femoral and popliteal   veins.  Doppler examination shows normal spontaneous and phasic flow.  No RIGHT calf vein thrombosis is detected.    LEFT:  Normal compressibility of the LEFT common femoral, femoral and popliteal   veins.  Doppler examination shows normal spontaneous and phasic flow.  No LEFT calf vein thrombosis is detected.    IMPRESSION:  No evidence of deep venous thrombosis in either lower extremity.    < end of copied text >  < from: CT Head/C-Spine No Cont (06.24.22 @ 20:33) >  IMPRESSION:    CT HEAD: No acute intracranial hemorrhage, mass effect, or osseous   fracture. Chronic changes, as described.    CT CERVICAL SPINE: No acute cervical spine fracture or traumatic   malalignment. Multilevel cervical spondylosis.    < end of copied text >  < from: Xray Chest 1 View- PORTABLE-Urgent (06.24.22 @ 20:14) >  FINDINGS:  CATHETERS AND TUBES: None    PULMONARY: The visualized lungs are clear of airspace consolidations or   effusions.   No pneumothorax.    HEART/VASCULAR: The heart and mediastinum size and configuration are   withinnormal limits.    BONES: Visualized osseous structures are intact.    IMPRESSION:   No radiographic evidence of active chest disease.    < end of copied text >

## 2022-06-27 NOTE — SWALLOW BEDSIDE ASSESSMENT ADULT - COMMENTS
67 yo M with PMHx of Multiple Sclerosis (dx  20 yrs ago), HTN, preDM, SATHISH, HLD who was brought in after being found on his bathroom floor by his son earlier today. Pt does not ambulate due to his  Multiple Sclerosis.  He uses a power chair.   Pt reports he fell yesterday while transferring from the toilet to a chair and spent approximately 30 hrs on the floor before being found.   He endorses R jaw soreness, denies LOC, dysuria, hematuria, or frequency.  He is not sure if he hit his head.   Pt lives alone, he is not fully independent in ADL's.   Note Hx MS and of sleep apnea

## 2022-06-27 NOTE — PROGRESS NOTE ADULT - SUBJECTIVE AND OBJECTIVE BOX
Date of service: 06-27-22 @ 13:39      Patient lying in bed; no complaints      ROS unable to obtain secondary to patient medical condition     MEDICATIONS  (STANDING):  amLODIPine   Tablet 5 milliGRAM(s) Oral daily  aspirin enteric coated 81 milliGRAM(s) Oral daily  dextrose 5%. 1000 milliLiter(s) (75 mL/Hr) IV Continuous <Continuous>  enoxaparin Injectable 40 milliGRAM(s) SubCutaneous every 24 hours  levothyroxine 112 MICROGram(s) Oral daily  modafinil 200 milliGRAM(s) Oral daily  multivitamin 1 Tablet(s) Oral daily  piperacillin/tazobactam IVPB.. 3.375 Gram(s) IV Intermittent every 8 hours    MEDICATIONS  (PRN):  acetaminophen     Tablet .. 650 milliGRAM(s) Oral every 6 hours PRN Temp greater or equal to 38C (100.4F), Mild Pain (1 - 3)  acetaminophen     Tablet .. 650 milliGRAM(s) Oral every 6 hours PRN Temp greater or equal to 38C (100.4F)  aluminum hydroxide/magnesium hydroxide/simethicone Suspension 30 milliLiter(s) Oral every 4 hours PRN Dyspepsia  clonazePAM  Tablet 1 milliGRAM(s) Oral three times a day PRN anxiety  ibuprofen  Tablet. 400 milliGRAM(s) Oral every 6 hours PRN Temp greater or equal to 38C (100.4F)  melatonin 3 milliGRAM(s) Oral at bedtime PRN Insomnia  ondansetron Injectable 4 milliGRAM(s) IV Push every 8 hours PRN Nausea and/or Vomiting      Vital Signs Last 24 Hrs  T(C): 36.4 (27 Jun 2022 07:43), Max: 37.9 (26 Jun 2022 14:29)  T(F): 97.6 (27 Jun 2022 07:43), Max: 100.3 (26 Jun 2022 14:29)  HR: 90 (27 Jun 2022 07:43) (90 - 99)  BP: 139/70 (27 Jun 2022 07:43) (139/70 - 164/67)  BP(mean): --  RR: 17 (27 Jun 2022 07:43) (17 - 18)  SpO2: 94% (27 Jun 2022 07:43) (93% - 94%)        Physical Exam:          Constitutional: frail looking  HEENT: NC/AT, EOMI, PERRLA, conjunctivae clear; ears and nose atraumatic; pharynx clear  Neck: supple; thyroid not palpable  Back: no tenderness  Respiratory: respiratory effort normal; clear to auscultation  Cardiovascular: S1S2 regular, no murmurs  Abdomen: soft, not tender, not distended, positive BS; no liver or spleen organomegaly  Genitourinary: no suprapubic tenderness  Musculoskeletal: no muscle tenderness, no joint swelling or tenderness  Neurological/ Psychiatric:  moving all extremities  Skin: no rashes; no palpable lesions    Labs: all available labs reviewed                      Labs:                     Labs:                        13.8   9.13  )-----------( 255      ( 27 Jun 2022 10:17 )             41.2     06-27    141  |  107  |  9   ----------------------------<  231<H>  3.6   |  28  |  0.96    Ca    8.7      27 Jun 2022 10:17    TPro  6.5  /  Alb  2.7<L>  /  TBili  0.7  /  DBili  x   /  AST  41<H>  /  ALT  39  /  AlkPhos  91  06-27    Lactate, Blood (06.26.22 @ 17:04)    Lactate, Blood: 1.2 mmol/L           Cultures:       Babesia microti PCR, Blood. (collected 06-25-22 @ 20:00)  Source: .Blood None  Final Report (06-26-22 @ 07:26):    Babesia microti PCR    Results: NOT detected    ***************Result Note*************    The detection of Babesia microti by PCR has only been    validated for whole blood; this test has not been approved    by the [place] Food and Drug Administration (FDA). Performance    characteristics of this assay have been determined by    [facility]. The clinical significance    of results should be considered in conjunction with the    overall clinical presentation of the patient. Result is not    intended to be used as the sole means for clinical diagnosis    or patient management decisions.    One negative sample does not necessarily rule    out the presence of a parasitic infection.    Culture - Blood (collected 06-25-22 @ 02:19)  Source: .Blood None  Preliminary Report (06-26-22 @ 10:01):    No growth to date.    Culture - Blood (collected 06-25-22 @ 02:18)  Source: .Blood None  Preliminary Report (06-26-22 @ 10:01):    No growth to date.    Culture - Urine (collected 06-24-22 @ 19:39)  Source: Clean Catch Clean Catch (Midstream)  Final Report (06-26-22 @ 19:15):    >=3 organisms. Probable collection contamination.      D-Dimer Assay, Quantitative: 608 ng/mL DDU (06-25-22 @ 08:13)          Radiology: all available radiological tests reviewed    Advanced directives addressed: full resuscitation

## 2022-06-28 LAB
ALBUMIN SERPL ELPH-MCNC: 2.6 G/DL — LOW (ref 3.3–5)
ALP SERPL-CCNC: 81 U/L — SIGNIFICANT CHANGE UP (ref 40–120)
ALT FLD-CCNC: 39 U/L — SIGNIFICANT CHANGE UP (ref 12–78)
ANION GAP SERPL CALC-SCNC: 5 MMOL/L — SIGNIFICANT CHANGE UP (ref 5–17)
AST SERPL-CCNC: 34 U/L — SIGNIFICANT CHANGE UP (ref 15–37)
B MICROTI IGG TITR SER: SIGNIFICANT CHANGE UP
B MICROTI IGM TITR SER: SIGNIFICANT CHANGE UP
BASOPHILS # BLD AUTO: 0.06 K/UL — SIGNIFICANT CHANGE UP (ref 0–0.2)
BASOPHILS NFR BLD AUTO: 0.7 % — SIGNIFICANT CHANGE UP (ref 0–2)
BILIRUB SERPL-MCNC: 0.5 MG/DL — SIGNIFICANT CHANGE UP (ref 0.2–1.2)
BUN SERPL-MCNC: 11 MG/DL — SIGNIFICANT CHANGE UP (ref 7–23)
CALCIUM SERPL-MCNC: 9 MG/DL — SIGNIFICANT CHANGE UP (ref 8.5–10.1)
CHLORIDE SERPL-SCNC: 109 MMOL/L — HIGH (ref 96–108)
CK SERPL-CCNC: 755 U/L — HIGH (ref 26–308)
CO2 SERPL-SCNC: 28 MMOL/L — SIGNIFICANT CHANGE UP (ref 22–31)
CREAT SERPL-MCNC: 1.03 MG/DL — SIGNIFICANT CHANGE UP (ref 0.5–1.3)
EGFR: 79 ML/MIN/1.73M2 — SIGNIFICANT CHANGE UP
EOSINOPHIL # BLD AUTO: 0.27 K/UL — SIGNIFICANT CHANGE UP (ref 0–0.5)
EOSINOPHIL NFR BLD AUTO: 3.3 % — SIGNIFICANT CHANGE UP (ref 0–6)
GLUCOSE SERPL-MCNC: 226 MG/DL — HIGH (ref 70–99)
HCT VFR BLD CALC: 41 % — SIGNIFICANT CHANGE UP (ref 39–50)
HGB BLD-MCNC: 14 G/DL — SIGNIFICANT CHANGE UP (ref 13–17)
IMM GRANULOCYTES NFR BLD AUTO: 0.5 % — SIGNIFICANT CHANGE UP (ref 0–1.5)
LYMPHOCYTES # BLD AUTO: 2.47 K/UL — SIGNIFICANT CHANGE UP (ref 1–3.3)
LYMPHOCYTES # BLD AUTO: 30 % — SIGNIFICANT CHANGE UP (ref 13–44)
MCHC RBC-ENTMCNC: 31.5 PG — SIGNIFICANT CHANGE UP (ref 27–34)
MCHC RBC-ENTMCNC: 34.1 GM/DL — SIGNIFICANT CHANGE UP (ref 32–36)
MCV RBC AUTO: 92.3 FL — SIGNIFICANT CHANGE UP (ref 80–100)
MONOCYTES # BLD AUTO: 0.73 K/UL — SIGNIFICANT CHANGE UP (ref 0–0.9)
MONOCYTES NFR BLD AUTO: 8.9 % — SIGNIFICANT CHANGE UP (ref 2–14)
NEUTROPHILS # BLD AUTO: 4.65 K/UL — SIGNIFICANT CHANGE UP (ref 1.8–7.4)
NEUTROPHILS NFR BLD AUTO: 56.6 % — SIGNIFICANT CHANGE UP (ref 43–77)
PLATELET # BLD AUTO: 258 K/UL — SIGNIFICANT CHANGE UP (ref 150–400)
POTASSIUM SERPL-MCNC: 3.8 MMOL/L — SIGNIFICANT CHANGE UP (ref 3.5–5.3)
POTASSIUM SERPL-SCNC: 3.8 MMOL/L — SIGNIFICANT CHANGE UP (ref 3.5–5.3)
PROT SERPL-MCNC: 6.2 GM/DL — SIGNIFICANT CHANGE UP (ref 6–8.3)
RBC # BLD: 4.44 M/UL — SIGNIFICANT CHANGE UP (ref 4.2–5.8)
RBC # FLD: 12.5 % — SIGNIFICANT CHANGE UP (ref 10.3–14.5)
SODIUM SERPL-SCNC: 142 MMOL/L — SIGNIFICANT CHANGE UP (ref 135–145)
WBC # BLD: 8.22 K/UL — SIGNIFICANT CHANGE UP (ref 3.8–10.5)
WBC # FLD AUTO: 8.22 K/UL — SIGNIFICANT CHANGE UP (ref 3.8–10.5)

## 2022-06-28 PROCEDURE — 99233 SBSQ HOSP IP/OBS HIGH 50: CPT | Mod: GC

## 2022-06-28 RX ADMIN — PIPERACILLIN AND TAZOBACTAM 25 GRAM(S): 4; .5 INJECTION, POWDER, LYOPHILIZED, FOR SOLUTION INTRAVENOUS at 21:35

## 2022-06-28 RX ADMIN — PIPERACILLIN AND TAZOBACTAM 25 GRAM(S): 4; .5 INJECTION, POWDER, LYOPHILIZED, FOR SOLUTION INTRAVENOUS at 13:58

## 2022-06-28 RX ADMIN — Medication 112 MICROGRAM(S): at 05:57

## 2022-06-28 RX ADMIN — ENOXAPARIN SODIUM 40 MILLIGRAM(S): 100 INJECTION SUBCUTANEOUS at 13:58

## 2022-06-28 RX ADMIN — AMLODIPINE BESYLATE 5 MILLIGRAM(S): 2.5 TABLET ORAL at 09:48

## 2022-06-28 RX ADMIN — Medication 1 MILLIGRAM(S): at 21:35

## 2022-06-28 RX ADMIN — Medication 81 MILLIGRAM(S): at 09:47

## 2022-06-28 RX ADMIN — Medication 1 TABLET(S): at 09:48

## 2022-06-28 RX ADMIN — MODAFINIL 200 MILLIGRAM(S): 200 TABLET ORAL at 11:01

## 2022-06-28 RX ADMIN — PIPERACILLIN AND TAZOBACTAM 25 GRAM(S): 4; .5 INJECTION, POWDER, LYOPHILIZED, FOR SOLUTION INTRAVENOUS at 05:57

## 2022-06-28 NOTE — PROGRESS NOTE ADULT - ASSESSMENT
65 yo M with PMHx of Multiple Sclerosis (dx  20 yrs ago), HTN, preDM, SATHISH, HLD who was brought in after being found on his bathroom floor by his son earlier today. Pt does not ambulate due to his  Multiple Sclerosis.  He uses a power chair.   Pt reports he fell yesterday while transferring from the toilet to a chair and spent approximately 30 hrs on the floor before being found.   He endorses R jaw soreness, denies LOC, dysuria, hematuria, or frequency.  He is not sure if he hit his head.   Pt lives alone, he is not fully independent in ADL's.       #Severe Sepsis 2/2 UTI   - Pt presented for rhabdo, later found to have UTI; During hospital course. Pt met sepsis criteria, with SIRS 3/4 (Leukocytosis, Tachycardia, Fever) with elevated lactate, urinary source  - Currently without fever for >24 hours  - Hx of recurrent UTI's in the setting of MS, UA + for LE, bacteria, WBC  - Lactic acidosis also could be secondary to overproduction from ischemic muscle  - Lactate 2.8 on admission; elevated once more during hospital course, now downtrending  - S/p NS boluses, Now on  mL/hr  - C/w zosyn, will transition to Augmentin for DC  - Blood culture no growth to date   - ID consulted -Advised to continue present management     #Rhabdomyolysis 2/2 fall with subsequent prolonged immobilization  - CT Head and Cervical spine- no acute findings   - UA large blood, rbc 11-25  - CPK [id number], trending down  - S/p 2L IVF in ED, c/w LR at 100 cc/hr  - Continue to trend labs  - PT consult   - SW consult     #Leukocytosis  #Erythrocytosis  -Improving   - Reactive from above, dehydration component as well  - C/w hydration  - Monitor CBC    #pre DM  - On metformin 500 qD at home  - A1C at 6.4    #MS  -cont modafinil 200 mg qD    #HTN  -cont amlodipine 5 mg     #Hypothyroidism   -cont levothyroxine 100 mcg qD    #HLD  -hold statin in the setting of rhabdo     #DVT PPx   -Lovenox 40 mg qD    #GOC  -FULL CODE (agrees to chest compressions and intubation if needed)     #DISPO  - Improving, will likely need rehab    d/w Dr Sebastian Dong

## 2022-06-28 NOTE — PROGRESS NOTE ADULT - SUBJECTIVE AND OBJECTIVE BOX
HPI:  67 yo M with PMHx of Multiple Sclerosis (dx  20 yrs ago), HTN, preDM, SATHISH, HLD who was brought in after being found on his bathroom floor by his son earlier today. Pt does not ambulate due to his  Multiple Sclerosis.  He uses a power chair.   Pt reports he fell yesterday while transferring from the toilet to a chair and spent approximately 30 hrs on the floor before being found.   He endorses R jaw soreness, denies LOC, dysuria, hematuria, or frequency.  He is not sure if he hit his head.   Pt lives alone, he is not fully independent in ADL's.   ED Course: T 98.7, , /95, SpO2 97% @ RA. EKG: sinus tachy. 2L IVF. (24 Jun 2022 23:49)    06/25/22: Pt notes that he feels tired and weak, some pain in the upper left arm that he fell on, no other sxs or complaints at this time  06/26/22: Pt seen by his bedside, not in acute distress. Pt have no complaints at this time   06/27/22: No overnight events. Pt feels better today. No complaints or sxs to report at this time.  06/28/22: No fevers overnight. Pt feels ok, however noted to still have weakness and unable to transfer from bed to chair.    Pt denies fevers, chills, ha, palpitations, SOB, cp. ROS as stated above    Vital Signs Last 24 Hrs  T(C): 36.7 (28 Jun 2022 07:30), Max: 36.8 (27 Jun 2022 15:17)  T(F): 98 (28 Jun 2022 07:30), Max: 98.2 (27 Jun 2022 15:17)  HR: 86 (28 Jun 2022 07:30) (86 - 93)  BP: 138/79 (28 Jun 2022 07:30) (133/71 - 156/77)  BP(mean): --  RR: 20 (28 Jun 2022 07:30) (18 - 20)  SpO2: 94% (28 Jun 2022 07:30) (94% - 96%)    GENERAL: NAD, lying in bed comfortably  HEAD:  Atraumatic, Normocephalic  EYES: Lateral nystagmus towards L.  conjunctiva and sclera clear  ENT: Moist mucous membranes  NECK: Supple, No JVD  CHEST/LUNG: Clear to auscultation bilaterally; No rales, rhonchi, wheezing, or rubs. Unlabored respirations  HEART: Tachycardic  No murmurs, rubs, or gallops  ABDOMEN: Bowel sounds present; Soft, Nontender, Nondistended. No hepatomegaly  EXTREMITIES:  2+ Peripheral Pulses, brisk capillary refill. No clubbing, cyanosis  NERVOUS SYSTEM:  Alert & Oriented X3, speech clear. No deficits   MSK: Full and equal strength b/L UE, unable to move b/L LE, sensation intact  SKIN: Excoriations L upper LE    MEDICATIONS:  MEDICATIONS  (STANDING):  amLODIPine   Tablet 5 milliGRAM(s) Oral daily  aspirin enteric coated 81 milliGRAM(s) Oral daily  enoxaparin Injectable 40 milliGRAM(s) SubCutaneous every 24 hours  lactated ringers. 1000 milliLiter(s) (100 mL/Hr) IV Continuous <Continuous>  levothyroxine 112 MICROGram(s) Oral daily  modafinil 200 milliGRAM(s) Oral daily  multivitamin 1 Tablet(s) Oral daily  piperacillin/tazobactam IVPB.. 3.375 Gram(s) IV Intermittent every 8 hours    MEDICATIONS  (PRN):  acetaminophen     Tablet .. 650 milliGRAM(s) Oral every 6 hours PRN Temp greater or equal to 38C (100.4F), Mild Pain (1 - 3)  acetaminophen     Tablet .. 650 milliGRAM(s) Oral every 6 hours PRN Temp greater or equal to 38C (100.4F)  aluminum hydroxide/magnesium hydroxide/simethicone Suspension 30 milliLiter(s) Oral every 4 hours PRN Dyspepsia  clonazePAM  Tablet 1 milliGRAM(s) Oral three times a day PRN anxiety  ibuprofen  Tablet. 400 milliGRAM(s) Oral every 6 hours PRN Temp greater or equal to 38C (100.4F)  melatonin 3 milliGRAM(s) Oral at bedtime PRN Insomnia  ondansetron Injectable 4 milliGRAM(s) IV Push every 8 hours PRN Nausea and/or Vomiting      LABS: All Labs Reviewed:                        14.0   8.22  )-----------( 258      ( 28 Jun 2022 09:28 )             41.0     06-28    142  |  109<H>  |  11  ----------------------------<  226<H>  3.8   |  28  |  1.03    Ca    9.0      28 Jun 2022 09:28    TPro  6.2  /  Alb  2.6<L>  /  TBili  0.5  /  DBili  x   /  AST  34  /  ALT  39  /  AlkPhos  81  06-28      CARDIAC MARKERS ( 28 Jun 2022 09:28 )  x     / x     / 755 U/L / x     / x      CARDIAC MARKERS ( 27 Jun 2022 10:17 )  x     / x     / 1435 U/L / x     / x          Blood Culture: 06-25 @ 20:00  Organism --  Gram Stain Blood -- Gram Stain --  Specimen Source .Blood None  Culture-Blood --    06-25 @ 02:19  Organism --  Gram Stain Blood -- Gram Stain --  Specimen Source .Blood None  Culture-Blood --    06-25 @ 02:18  Organism --  Gram Stain Blood -- Gram Stain --  Specimen Source .Blood None  Culture-Blood --    06-24 @ 19:39  Organism --  Gram Stain Blood -- Gram Stain --  Specimen Source Clean Catch Clean Catch (Midstream)  Culture-Blood --      I&O's Summary    27 Jun 2022 07:01  -  28 Jun 2022 07:00  --------------------------------------------------------  IN: 0 mL / OUT: 400 mL / NET: -400 mL      CAPILLARY BLOOD GLUCOSE        EKG/RADIOLOGY  < from: US Duplex Venous Lower Ext Complete, Bilateral (06.25.22 @ 04:44) >  FINDINGS:    RIGHT:  Normal compressibility of the RIGHT common femoral, femoral and popliteal   veins.  Doppler examination shows normal spontaneous and phasic flow.  No RIGHT calf vein thrombosis is detected.    LEFT:  Normal compressibility of the LEFT common femoral, femoral and popliteal   veins.  Doppler examination shows normal spontaneous and phasic flow.  No LEFT calf vein thrombosis is detected.    IMPRESSION:  No evidence of deep venous thrombosis in either lower extremity.    < end of copied text >  < from: CT Head/C-Spine No Cont (06.24.22 @ 20:33) >  IMPRESSION:    CT HEAD: No acute intracranial hemorrhage, mass effect, or osseous   fracture. Chronic changes, as described.    CT CERVICAL SPINE: No acute cervical spine fracture or traumatic   malalignment. Multilevel cervical spondylosis.    < end of copied text >  < from: Xray Chest 1 View- PORTABLE-Urgent (06.24.22 @ 20:14) >  FINDINGS:  CATHETERS AND TUBES: None    PULMONARY: The visualized lungs are clear of airspace consolidations or   effusions.   No pneumothorax.    HEART/VASCULAR: The heart and mediastinum size and configuration are   withinnormal limits.    BONES: Visualized osseous structures are intact.    IMPRESSION:   No radiographic evidence of active chest disease.    < end of copied text >

## 2022-06-29 ENCOUNTER — TRANSCRIPTION ENCOUNTER (OUTPATIENT)
Age: 67
End: 2022-06-29

## 2022-06-29 VITALS
OXYGEN SATURATION: 97 % | SYSTOLIC BLOOD PRESSURE: 130 MMHG | RESPIRATION RATE: 18 BRPM | HEART RATE: 88 BPM | DIASTOLIC BLOOD PRESSURE: 69 MMHG | TEMPERATURE: 98 F

## 2022-06-29 LAB
ALBUMIN SERPL ELPH-MCNC: 2.7 G/DL — LOW (ref 3.3–5)
ALP SERPL-CCNC: 89 U/L — SIGNIFICANT CHANGE UP (ref 40–120)
ALT FLD-CCNC: 42 U/L — SIGNIFICANT CHANGE UP (ref 12–78)
ANION GAP SERPL CALC-SCNC: 3 MMOL/L — LOW (ref 5–17)
AST SERPL-CCNC: 32 U/L — SIGNIFICANT CHANGE UP (ref 15–37)
BASOPHILS # BLD AUTO: 0.08 K/UL — SIGNIFICANT CHANGE UP (ref 0–0.2)
BASOPHILS NFR BLD AUTO: 1 % — SIGNIFICANT CHANGE UP (ref 0–2)
BILIRUB SERPL-MCNC: 0.5 MG/DL — SIGNIFICANT CHANGE UP (ref 0.2–1.2)
BUN SERPL-MCNC: 11 MG/DL — SIGNIFICANT CHANGE UP (ref 7–23)
CALCIUM SERPL-MCNC: 9.1 MG/DL — SIGNIFICANT CHANGE UP (ref 8.5–10.1)
CHLORIDE SERPL-SCNC: 108 MMOL/L — SIGNIFICANT CHANGE UP (ref 96–108)
CO2 SERPL-SCNC: 29 MMOL/L — SIGNIFICANT CHANGE UP (ref 22–31)
CREAT SERPL-MCNC: 0.92 MG/DL — SIGNIFICANT CHANGE UP (ref 0.5–1.3)
EGFR: 91 ML/MIN/1.73M2 — SIGNIFICANT CHANGE UP
EOSINOPHIL # BLD AUTO: 0.28 K/UL — SIGNIFICANT CHANGE UP (ref 0–0.5)
EOSINOPHIL NFR BLD AUTO: 3.5 % — SIGNIFICANT CHANGE UP (ref 0–6)
GLUCOSE SERPL-MCNC: 194 MG/DL — HIGH (ref 70–99)
HCT VFR BLD CALC: 42.6 % — SIGNIFICANT CHANGE UP (ref 39–50)
HGB BLD-MCNC: 14.1 G/DL — SIGNIFICANT CHANGE UP (ref 13–17)
IMM GRANULOCYTES NFR BLD AUTO: 0.5 % — SIGNIFICANT CHANGE UP (ref 0–1.5)
LYMPHOCYTES # BLD AUTO: 2.49 K/UL — SIGNIFICANT CHANGE UP (ref 1–3.3)
LYMPHOCYTES # BLD AUTO: 31 % — SIGNIFICANT CHANGE UP (ref 13–44)
MCHC RBC-ENTMCNC: 30.8 PG — SIGNIFICANT CHANGE UP (ref 27–34)
MCHC RBC-ENTMCNC: 33.1 GM/DL — SIGNIFICANT CHANGE UP (ref 32–36)
MCV RBC AUTO: 93 FL — SIGNIFICANT CHANGE UP (ref 80–100)
MONOCYTES # BLD AUTO: 0.77 K/UL — SIGNIFICANT CHANGE UP (ref 0–0.9)
MONOCYTES NFR BLD AUTO: 9.6 % — SIGNIFICANT CHANGE UP (ref 2–14)
NEUTROPHILS # BLD AUTO: 4.37 K/UL — SIGNIFICANT CHANGE UP (ref 1.8–7.4)
NEUTROPHILS NFR BLD AUTO: 54.4 % — SIGNIFICANT CHANGE UP (ref 43–77)
PLATELET # BLD AUTO: 294 K/UL — SIGNIFICANT CHANGE UP (ref 150–400)
POTASSIUM SERPL-MCNC: 3.8 MMOL/L — SIGNIFICANT CHANGE UP (ref 3.5–5.3)
POTASSIUM SERPL-SCNC: 3.8 MMOL/L — SIGNIFICANT CHANGE UP (ref 3.5–5.3)
PROT SERPL-MCNC: 6.6 GM/DL — SIGNIFICANT CHANGE UP (ref 6–8.3)
RBC # BLD: 4.58 M/UL — SIGNIFICANT CHANGE UP (ref 4.2–5.8)
RBC # FLD: 12.5 % — SIGNIFICANT CHANGE UP (ref 10.3–14.5)
SARS-COV-2 RNA SPEC QL NAA+PROBE: SIGNIFICANT CHANGE UP
SODIUM SERPL-SCNC: 140 MMOL/L — SIGNIFICANT CHANGE UP (ref 135–145)
WBC # BLD: 8.03 K/UL — SIGNIFICANT CHANGE UP (ref 3.8–10.5)
WBC # FLD AUTO: 8.03 K/UL — SIGNIFICANT CHANGE UP (ref 3.8–10.5)

## 2022-06-29 PROCEDURE — 99239 HOSP IP/OBS DSCHRG MGMT >30: CPT

## 2022-06-29 RX ORDER — METFORMIN HYDROCHLORIDE 850 MG/1
1 TABLET ORAL
Qty: 15 | Refills: 0
Start: 2022-06-29 | End: 2022-07-13

## 2022-06-29 RX ORDER — METFORMIN HYDROCHLORIDE 850 MG/1
1 TABLET ORAL
Qty: 0 | Refills: 0 | DISCHARGE

## 2022-06-29 RX ADMIN — PIPERACILLIN AND TAZOBACTAM 25 GRAM(S): 4; .5 INJECTION, POWDER, LYOPHILIZED, FOR SOLUTION INTRAVENOUS at 06:28

## 2022-06-29 RX ADMIN — Medication 1 TABLET(S): at 10:09

## 2022-06-29 RX ADMIN — Medication 81 MILLIGRAM(S): at 10:08

## 2022-06-29 RX ADMIN — Medication 1 TABLET(S): at 10:08

## 2022-06-29 RX ADMIN — SODIUM CHLORIDE 100 MILLILITER(S): 9 INJECTION, SOLUTION INTRAVENOUS at 15:07

## 2022-06-29 RX ADMIN — Medication 112 MICROGRAM(S): at 06:28

## 2022-06-29 RX ADMIN — AMLODIPINE BESYLATE 5 MILLIGRAM(S): 2.5 TABLET ORAL at 10:08

## 2022-06-29 RX ADMIN — ENOXAPARIN SODIUM 40 MILLIGRAM(S): 100 INJECTION SUBCUTANEOUS at 15:08

## 2022-06-29 NOTE — DISCHARGE NOTE PROVIDER - CARE PROVIDER_API CALL
Jeanne Dial (DO)  [facility]ly [facility]  [address] [place] [address]  Phone: [phone]  Fax: [phone]  Established Patient  Follow Up Time:

## 2022-06-29 NOTE — DISCHARGE NOTE PROVIDER - HOSPITAL COURSE
67 yo M with PMHx of Multiple Sclerosis, HTN, preDM, SATHISH, HLD here for progressive weakness and also arm pain. Pt notes several days hx of generalized weakness, no alleviating/aggravating factors but progressive, causing pt to fall prior to admission, transferring from the toilet to a chair, causing him to injure rt arm. Pt was found be son who had him BIBA to [facility]. In the ED, pt was found to be septic with tachycardia, leukocytosis, and elevated lactate. Additionally pt was found to have hematuria and CK was 8390. UA was notable for blood, bacteria, LE, WBC’s. CXR was unremarkable; CT head and c-spine showed no acute pathology. Pt was given fluids, Ceftriaxone, home meds and admitted to the floor. Pt was followed by Infectious Disease and his course was complicated by intermittent fevers; antibiotics were switched to Zosyn. Pt improved and blood/urine cx’s came back negative. Today pt is feeling better, afebrile, hemodynamically stable, with decreased sxs. He will be discharged today to finish course of oral abx and outpatient follow-up with his outpatient pcp and specialists.     Subjective: Pt continues to have no fevers. No pain reported today, no worsening urinary sxs. Still weak. Plan for discharged was reported to patient; all questions and issues answered. Pt denies fevers, chills, ha, palpitations, SOB, cp. ROS as stated above.    Vital Signs Last 24 Hrs  T(C): 36.7 (29 Jun 2022 07:19), Max: 36.8 (28 Jun 2022 14:51)  T(F): 98.1 (29 Jun 2022 07:19), Max: 98.3 (28 Jun 2022 14:51)  HR: 82 (29 Jun 2022 07:19) (82 - 102)  BP: 152/75 (29 Jun 2022 07:19) (117/82 - 152/75)  BP(mean): --  RR: 19 (29 Jun 2022 07:19) (19 - 20)  SpO2: 96% (29 Jun 2022 07:19) (93% - 99%)    GENERAL: NAD, lying in bed comfortably  HEAD:  Atraumatic, Normocephalic  EYES: Lateral nystagmus towards L.  conjunctiva and sclera clear  ENT: Moist mucous membranes  NECK: Supple, No JVD  CHEST/LUNG: Clear to auscultation bilaterally; No rales, rhonchi, wheezing, or rubs. Unlabored respirations  HEART: Tachycardic  No murmurs, rubs, or gallops  ABDOMEN: Bowel sounds present; Soft, Nontender, Nondistended. No hepatomegaly  EXTREMITIES:  2+ Peripheral Pulses, brisk capillary refill. No clubbing, cyanosis  NERVOUS SYSTEM:  Alert & Oriented X3, speech clear. No deficits   MSK: Full and equal strength b/L UE, unable to move b/L LE, sensation intact  SKIN: Excoriations L upper LE    EKG/RADIOLOGY  < from: Xray Chest 1 View- PORTABLE-Urgent (Xray Chest 1 View- PORTABLE-Urgent .) (06.25.22 @ 14:06) >      IMPRESSION:   No radiographic evidence of active chest disease.    < end of copied text >    < from: US Duplex Venous Lower Ext Complete, Bilateral (06.25.22 @ 04:44) >      IMPRESSION:  No evidence of deep venous thrombosis in either lower extremity.    < end of copied text >    < from: CT Head/C-Spine No Cont (06.24.22 @ 20:33) >    IMPRESSION:    CT HEAD: No acute intracranial hemorrhage, mass effect, or osseous   fracture. Chronic changes, as described.    CT CERVICAL SPINE: No acute cervical spine fracture or traumatic   malalignment. Multilevel cervical spondylosis.    < end of copied text >

## 2022-06-29 NOTE — PROGRESS NOTE ADULT - REASON FOR ADMISSION
Rhabdomyolysis

## 2022-06-29 NOTE — DISCHARGE NOTE PROVIDER - CARE PROVIDERS DIRECT ADDRESSES
[email or web address]

## 2022-06-29 NOTE — PROGRESS NOTE ADULT - SUBJECTIVE AND OBJECTIVE BOX
Pt has been seen and examined with FP resident, resident supervised agree with a/p       Patient is a 66y old  Male who presents with a chief complaint of Rhabdomyolysis (26 Jun 2022 17:35)        -rs-aeeb, cta  -cvs-s1s2 normal   -p/a-soft,bs+      A/P    #d/c today with further management as an outpt, time spent 45 minutes

## 2022-06-29 NOTE — PROGRESS NOTE ADULT - PROVIDER SPECIALTY LIST ADULT
Family Medicine
Family Medicine
Hospitalist
Infectious Disease
Family Medicine
Family Medicine
Hospitalist
Hospitalist
Infectious Disease

## 2022-06-29 NOTE — DISCHARGE NOTE PROVIDER - NSDCCAREPROVSEEN_GEN_ALL_CORE_FT
Valdez, Mejia Duncan, Jorge A Lopez, Marcus Dong, Chaim Prakash, Alanis Hopper, Kalee De Souza, Che Shelton, Kevin Aguilar, Olga Edmonds, Lorenzo Adams, Leroy

## 2022-06-29 NOTE — DISCHARGE NOTE PROVIDER - NSDCCPCAREPLAN_GEN_ALL_CORE_FT
PRINCIPAL DISCHARGE DIAGNOSIS  Diagnosis: Acute UTI  Assessment and Plan of Treatment: You came here with confusion and urinary symptoms and you were found to have a UTI. You were started on Ceftriaxone and your symptoms improved. Your blood culture and urine culture are currently negative.  - Please follow-up with your primary care provider ASAP for further management  - Please finish your course of oral antibiotics  Thank you!      SECONDARY DISCHARGE DIAGNOSES  Diagnosis: Rhabdomyolysis  Assessment and Plan of Treatment: You came here with red urine and muscle pain, and were found to have rhabdomylosis. Your creatine kinase was very elevated. We started you on fluids and you improved.  - Please follow-up with your primary care provider ASAP for further management

## 2022-06-29 NOTE — DISCHARGE NOTE NURSING/CASE MANAGEMENT/SOCIAL WORK - PATIENT PORTAL LINK FT
You can access the FollowMyHealth Patient Portal offered by [facility] by registering at the following website: [email or web address]. By joining [facility]’s FollowMyHealth portal, you will also be able to view your health information using other applications (apps) compatible with our system.

## 2022-06-29 NOTE — DISCHARGE NOTE PROVIDER - NSDCMRMEDTOKEN_GEN_ALL_CORE_FT
amLODIPine 5 mg oral tablet: 1 tab(s) orally once a day  Aspir 81 oral delayed release tablet: 1 tab(s) orally once a day  clonazePAM 1 mg oral tablet: 1 tab(s) orally 3 times a day, As Needed  Fish Oil 1000 mg oral capsule: 1 cap(s) orally once a day  levothyroxine 100 mcg (0.1 mg) oral tablet: 1 tab(s) orally once a day  metFORMIN 500 mg oral tablet: 1 tab(s) orally once a day  modafinil 200 mg oral tablet: 1 tab(s) orally once a day (in the morning)  Multiple Vitamins oral tablet: 1 tab(s) orally once a day  rosuvastatin 5 mg oral tablet: 1 tab(s) orally once a day  Vitamin D3 25 mcg (1000 intl units) oral tablet: 1 tab(s) orally once a day

## 2022-06-30 LAB
B BURGDOR DNA SPEC QL NAA+PROBE: NEGATIVE — SIGNIFICANT CHANGE UP
CULTURE RESULTS: SIGNIFICANT CHANGE UP
CULTURE RESULTS: SIGNIFICANT CHANGE UP
SPECIMEN SOURCE: SIGNIFICANT CHANGE UP
SPECIMEN SOURCE: SIGNIFICANT CHANGE UP

## 2022-07-05 DIAGNOSIS — E87.2 ACIDOSIS: ICD-10-CM
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
## 2022-07-05 DIAGNOSIS — A41.9 SEPSIS, UNSPECIFIED ORGANISM: ICD-10-CM

## 2022-07-05 DIAGNOSIS — R65.20 SEVERE SEPSIS WITHOUT SEPTIC SHOCK: ICD-10-CM

## 2022-07-05 DIAGNOSIS — G35 MULTIPLE SCLEROSIS: ICD-10-CM

## 2022-07-05 DIAGNOSIS — I10 ESSENTIAL (PRIMARY) HYPERTENSION: ICD-10-CM

## 2022-07-05 DIAGNOSIS — R73.03 PREDIABETES: ICD-10-CM

## 2022-07-05 DIAGNOSIS — T79.6XXA TRAUMATIC ISCHEMIA OF MUSCLE, INITIAL ENCOUNTER: ICD-10-CM
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
## 2022-07-05 DIAGNOSIS — E78.5 HYPERLIPIDEMIA, UNSPECIFIED: ICD-10-CM

## 2022-07-05 DIAGNOSIS — Z79.84 LONG TERM (CURRENT) USE OF ORAL HYPOGLYCEMIC DRUGS: ICD-10-CM

## 2022-07-05 DIAGNOSIS — E86.0 DEHYDRATION: ICD-10-CM

## 2022-07-05 DIAGNOSIS — Z79.82 LONG TERM (CURRENT) USE OF ASPIRIN: ICD-10-CM

## 2022-07-05 DIAGNOSIS — Z87.442 PERSONAL HISTORY OF URINARY CALCULI: ICD-10-CM

## 2022-07-05 DIAGNOSIS — J69.0 PNEUMONITIS DUE TO INHALATION OF FOOD AND VOMIT: ICD-10-CM

## 2022-07-05 DIAGNOSIS — E03.9 HYPOTHYROIDISM, UNSPECIFIED: ICD-10-CM
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
## 2022-07-05 DIAGNOSIS — N39.0 URINARY TRACT INFECTION, SITE NOT SPECIFIED: ICD-10-CM

## 2022-07-05 DIAGNOSIS — R00.0 TACHYCARDIA, UNSPECIFIED: ICD-10-CM

## 2022-07-05 DIAGNOSIS — Z98.61 CORONARY ANGIOPLASTY STATUS: ICD-10-CM
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
## 2022-07-05 DIAGNOSIS — G47.33 OBSTRUCTIVE SLEEP APNEA (ADULT) (PEDIATRIC): ICD-10-CM

## 2022-07-05 DIAGNOSIS — Z99.89 DEPENDENCE ON OTHER ENABLING MACHINES AND DEVICES: ICD-10-CM

## 2022-07-16 ENCOUNTER — RX RENEWAL (OUTPATIENT)
Age: 67
End: 2022-07-16

## 2022-07-16 RX ORDER — METFORMIN HYDROCHLORIDE 500 MG/1
500 TABLET, COATED ORAL
Qty: 15 | Refills: 0 | Status: ACTIVE | COMMUNITY
Start: 2022-07-16 | End: 1900-01-01

## 2022-10-13 ENCOUNTER — OFFICE (OUTPATIENT)
Dept: URBAN - METROPOLITAN AREA CLINIC 12 | Facility: CLINIC | Age: 67
Setting detail: OPHTHALMOLOGY
End: 2022-10-13
Payer: MEDICARE

## 2022-10-13 DIAGNOSIS — H40.013: ICD-10-CM

## 2022-10-13 DIAGNOSIS — H25.13: ICD-10-CM

## 2022-10-13 DIAGNOSIS — H55.01: ICD-10-CM

## 2022-10-13 PROCEDURE — 92250 FUNDUS PHOTOGRAPHY W/I&R: CPT | Performed by: OPHTHALMOLOGY

## 2022-10-13 PROCEDURE — 99204 OFFICE O/P NEW MOD 45 MIN: CPT | Performed by: OPHTHALMOLOGY

## 2022-10-13 PROCEDURE — 92020 GONIOSCOPY: CPT | Performed by: OPHTHALMOLOGY

## 2022-10-13 ASSESSMENT — REFRACTION_AUTOREFRACTION
OS_SPHERE: +1.00
OS_CYLINDER: -0.50
OS_AXIS: 032
OD_SPHERE: +1.00
OD_AXIS: 090
OD_CYLINDER: -0.75

## 2022-10-13 ASSESSMENT — AXIALLENGTH_DERIVED
OS_AL: 23.3033
OD_AL: 23.4866
OD_AL: 23.4866
OS_AL: 23.3033

## 2022-10-13 ASSESSMENT — KERATOMETRY
OS_K2POWER_DIOPTERS: 44.00
OS_K1POWER_DIOPTERS: 43.00
METHOD_AUTO_MANUAL: AUTO
OD_AXISANGLE_DEGREES: 067
OS_AXISANGLE_DEGREES: 102
OD_K2POWER_DIOPTERS: 43.50
OD_K1POWER_DIOPTERS: 42.75

## 2022-10-13 ASSESSMENT — VISUAL ACUITY
OS_BCVA: 20/60-
OD_BCVA: 20/125

## 2022-10-13 ASSESSMENT — REFRACTION_MANIFEST
OD_SPHERE: +1.00
OS_SPHERE: +1.00
OS_CYLINDER: -0.50
OD_CYLINDER: -0.75
OD_AXIS: 090
OS_AXIS: 030

## 2022-10-13 ASSESSMENT — SPHEQUIV_DERIVED
OS_SPHEQUIV: 0.75
OS_SPHEQUIV: 0.75
OD_SPHEQUIV: 0.625
OD_SPHEQUIV: 0.625

## 2022-10-13 ASSESSMENT — CONFRONTATIONAL VISUAL FIELD TEST (CVF)
OD_FINDINGS: FULL
OS_FINDINGS: FULL

## 2022-12-08 ENCOUNTER — OFFICE (OUTPATIENT)
Dept: URBAN - METROPOLITAN AREA CLINIC 12 | Facility: CLINIC | Age: 67
Setting detail: OPHTHALMOLOGY
End: 2022-12-08
Payer: MEDICARE

## 2022-12-08 DIAGNOSIS — H25.13: ICD-10-CM

## 2022-12-08 DIAGNOSIS — H25.11: ICD-10-CM

## 2022-12-08 DIAGNOSIS — H25.12: ICD-10-CM

## 2022-12-08 PROCEDURE — 92136 OPHTHALMIC BIOMETRY: CPT | Performed by: OPHTHALMOLOGY

## 2022-12-08 PROCEDURE — 99213 OFFICE O/P EST LOW 20 MIN: CPT | Performed by: OPHTHALMOLOGY

## 2022-12-08 ASSESSMENT — PACHYMETRY
OD_CT_CORRECTION: 1
OS_CT_UM: 515
OS_CT_CORRECTION: 2
OD_CT_UM: 525

## 2022-12-08 ASSESSMENT — TONOMETRY
OS_IOP_MMHG: 14
OD_IOP_MMHG: 11

## 2022-12-08 ASSESSMENT — CONFRONTATIONAL VISUAL FIELD TEST (CVF)
OD_FINDINGS: FULL
OS_FINDINGS: FULL

## 2022-12-13 ASSESSMENT — REFRACTION_AUTOREFRACTION
OD_CYLINDER: -1.00
OS_AXIS: 073
OD_SPHERE: +1.50
OS_SPHERE: +1.25
OS_CYLINDER: -0.75
OD_AXIS: 112

## 2022-12-13 ASSESSMENT — REFRACTION_MANIFEST
OS_CYLINDER: -0.50
OD_CYLINDER: -0.75
OS_SPHERE: +1.00
OD_AXIS: 090
OS_AXIS: 030
OD_SPHERE: +1.00

## 2022-12-13 ASSESSMENT — KERATOMETRY
OD_K1POWER_DIOPTERS: 42.25
OD_K2POWER_DIOPTERS: 43.00
OS_K2POWER_DIOPTERS: 43.75
OD_AXISANGLE_DEGREES: 031
METHOD_AUTO_MANUAL: AUTO
OS_AXISANGLE_DEGREES: 133
OS_K1POWER_DIOPTERS: 43.00

## 2022-12-13 ASSESSMENT — SPHEQUIV_DERIVED
OD_SPHEQUIV: 1
OS_SPHEQUIV: 0.75
OS_SPHEQUIV: 0.875
OD_SPHEQUIV: 0.625

## 2022-12-13 ASSESSMENT — VISUAL ACUITY
OS_BCVA: 20/60+2
OD_BCVA: 20/150

## 2022-12-13 ASSESSMENT — AXIALLENGTH_DERIVED
OD_AL: 23.6701
OS_AL: 23.3006
OD_AL: 23.5239
OS_AL: 23.3482

## 2023-01-05 ENCOUNTER — OFFICE (OUTPATIENT)
Dept: URBAN - METROPOLITAN AREA CLINIC 12 | Facility: CLINIC | Age: 68
Setting detail: OPHTHALMOLOGY
End: 2023-01-05
Payer: MEDICARE

## 2023-01-05 DIAGNOSIS — Z01.812: ICD-10-CM

## 2023-01-05 DIAGNOSIS — Z20.822: ICD-10-CM

## 2023-01-05 PROCEDURE — 99211 OFF/OP EST MAY X REQ PHY/QHP: CPT | Performed by: OPHTHALMOLOGY

## 2023-01-05 ASSESSMENT — REFRACTION_MANIFEST
OS_SPHERE: +1.00
OD_CYLINDER: -0.75
OD_AXIS: 090
OD_SPHERE: +1.00
OS_AXIS: 030
OS_CYLINDER: -0.50

## 2023-01-05 ASSESSMENT — AXIALLENGTH_DERIVED
OS_AL: 23.3482
OD_AL: 23.6701
OS_AL: 23.3006
OD_AL: 23.5239

## 2023-01-05 ASSESSMENT — KERATOMETRY
OS_K1POWER_DIOPTERS: 43.00
OS_AXISANGLE_DEGREES: 133
METHOD_AUTO_MANUAL: AUTO
OD_AXISANGLE_DEGREES: 031
OS_K2POWER_DIOPTERS: 43.75
OD_K1POWER_DIOPTERS: 42.25
OD_K2POWER_DIOPTERS: 43.00

## 2023-01-05 ASSESSMENT — REFRACTION_AUTOREFRACTION
OD_AXIS: 112
OS_AXIS: 073
OD_SPHERE: +1.50
OS_CYLINDER: -0.75
OS_SPHERE: +1.25
OD_CYLINDER: -1.00

## 2023-01-05 ASSESSMENT — VISUAL ACUITY
OD_BCVA: 20/150
OS_BCVA: 20/60+2

## 2023-01-05 ASSESSMENT — SPHEQUIV_DERIVED
OD_SPHEQUIV: 0.625
OD_SPHEQUIV: 1
OS_SPHEQUIV: 0.875
OS_SPHEQUIV: 0.75

## 2023-01-10 ENCOUNTER — AMBULATORY SURGERY CENTER (OUTPATIENT)
Dept: URBAN - METROPOLITAN AREA SURGERY 27 | Facility: SURGERY | Age: 68
Setting detail: OPHTHALMOLOGY
End: 2023-01-10
Payer: MEDICARE

## 2023-01-10 DIAGNOSIS — H25.12: ICD-10-CM

## 2023-01-10 PROCEDURE — 68841 INSJ RX ELUT IMPLT LAC CANAL: CPT | Performed by: OPHTHALMOLOGY

## 2023-01-10 PROCEDURE — 66984 XCAPSL CTRC RMVL W/O ECP: CPT | Performed by: OPHTHALMOLOGY

## 2023-01-11 ENCOUNTER — OFFICE (OUTPATIENT)
Dept: URBAN - METROPOLITAN AREA CLINIC 12 | Facility: CLINIC | Age: 68
Setting detail: OPHTHALMOLOGY
End: 2023-01-11
Payer: MEDICARE

## 2023-01-11 DIAGNOSIS — Z96.1: ICD-10-CM

## 2023-01-11 PROCEDURE — 99024 POSTOP FOLLOW-UP VISIT: CPT | Performed by: OPTOMETRIST

## 2023-01-11 ASSESSMENT — SPHEQUIV_DERIVED
OS_SPHEQUIV: 0.75
OD_SPHEQUIV: 0.375
OD_SPHEQUIV: 0.625
OS_SPHEQUIV: 0.625

## 2023-01-11 ASSESSMENT — REFRACTION_MANIFEST
OD_CYLINDER: -0.75
OD_SPHERE: +1.00
OS_AXIS: 030
OS_SPHERE: +1.00
OD_AXIS: 090
OS_CYLINDER: -0.50

## 2023-01-11 ASSESSMENT — KERATOMETRY
OD_K1POWER_DIOPTERS: 42.75
OD_K2POWER_DIOPTERS: 43.25
OS_K1POWER_DIOPTERS: 43.50
OD_AXISANGLE_DEGREES: 007
METHOD_AUTO_MANUAL: AUTO
OS_K2POWER_DIOPTERS: 44.00
OS_AXISANGLE_DEGREES: 116

## 2023-01-11 ASSESSMENT — AXIALLENGTH_DERIVED
OD_AL: 23.6295
OD_AL: 23.5322
OS_AL: 23.2613
OS_AL: 23.214

## 2023-01-11 ASSESSMENT — CORNEAL EDEMA CLINICAL DESCRIPTION: OS_CORNEALEDEMA: 1+

## 2023-01-11 ASSESSMENT — CONFRONTATIONAL VISUAL FIELD TEST (CVF)
OS_FINDINGS: FULL
OD_FINDINGS: FULL

## 2023-01-11 ASSESSMENT — REFRACTION_AUTOREFRACTION
OS_SPHERE: +1.25
OD_AXIS: 116
OD_SPHERE: +1.25
OS_CYLINDER: -1.25
OS_AXIS: 021
OD_CYLINDER: -1.75

## 2023-01-11 ASSESSMENT — VISUAL ACUITY
OD_BCVA: 20/150
OS_BCVA: 20/50-

## 2023-01-18 ENCOUNTER — OFFICE (OUTPATIENT)
Dept: URBAN - METROPOLITAN AREA CLINIC 12 | Facility: CLINIC | Age: 68
Setting detail: OPHTHALMOLOGY
End: 2023-01-18
Payer: MEDICARE

## 2023-01-18 ENCOUNTER — RX ONLY (RX ONLY)
Age: 68
End: 2023-01-18

## 2023-01-18 DIAGNOSIS — Z20.822: ICD-10-CM

## 2023-01-18 DIAGNOSIS — Z01.812: ICD-10-CM

## 2023-01-18 DIAGNOSIS — H25.11: ICD-10-CM

## 2023-01-18 PROCEDURE — 92136 OPHTHALMIC BIOMETRY: CPT | Performed by: OPHTHALMOLOGY

## 2023-01-18 PROCEDURE — 99211 OFF/OP EST MAY X REQ PHY/QHP: CPT | Performed by: OPHTHALMOLOGY

## 2023-01-18 ASSESSMENT — REFRACTION_AUTOREFRACTION
OS_AXIS: 24
OS_SPHERE: +0.25
OS_AXIS: 24
OD_CYLINDER: -1.00
OD_SPHERE: +1.25
OD_CYLINDER: -1.00
OS_CYLINDER: -1.25
OD_AXIS: 100
OS_SPHERE: +0.25
OS_CYLINDER: -1.25
OD_SPHERE: +1.25
OD_AXIS: 100

## 2023-01-18 ASSESSMENT — TONOMETRY
OS_IOP_MMHG: 13
OD_IOP_MMHG: 18

## 2023-01-18 ASSESSMENT — SPHEQUIV_DERIVED
OS_SPHEQUIV: -0.375
OD_SPHEQUIV: 0.75
OD_SPHEQUIV: 0.625
OS_SPHEQUIV: 0.75
OS_SPHEQUIV: -0.375
OD_SPHEQUIV: 0.625
OD_SPHEQUIV: 0.75
OS_SPHEQUIV: 0.75

## 2023-01-18 ASSESSMENT — KERATOMETRY
OD_AXISANGLE_DEGREES: 46
OD_K1POWER_DIOPTERS: 42.50
OD_K2POWER_DIOPTERS: 43.25
METHOD_AUTO_MANUAL: AUTO
OS_AXISANGLE_DEGREES: 104
OS_AXISANGLE_DEGREES: 104
OD_K2POWER_DIOPTERS: 43.25
OD_AXISANGLE_DEGREES: 46
METHOD_AUTO_MANUAL: AUTO
OD_K1POWER_DIOPTERS: 42.50
OS_K2POWER_DIOPTERS: 44.00
OS_K2POWER_DIOPTERS: 44.00
OS_K1POWER_DIOPTERS: 42.75
OS_K1POWER_DIOPTERS: 42.75

## 2023-01-18 ASSESSMENT — AXIALLENGTH_DERIVED
OS_AL: 23.3482
OD_AL: 23.5294
OD_AL: 23.578
OS_AL: 23.3482
OD_AL: 23.5294
OD_AL: 23.578
OS_AL: 23.7855
OS_AL: 23.7855

## 2023-01-18 ASSESSMENT — REFRACTION_MANIFEST
OD_SPHERE: +1.00
OS_SPHERE: +1.00
OS_CYLINDER: -0.50
OS_AXIS: 030
OS_AXIS: 030
OD_SPHERE: +1.00
OD_CYLINDER: -0.75
OS_SPHERE: +1.00
OD_CYLINDER: -0.75
OS_CYLINDER: -0.50
OD_AXIS: 090
OD_AXIS: 090

## 2023-01-18 ASSESSMENT — PACHYMETRY
OD_CT_UM: 525
OS_CT_UM: 515
OS_CT_CORRECTION: 2
OD_CT_CORRECTION: 1

## 2023-01-18 ASSESSMENT — VISUAL ACUITY
OS_BCVA: 20/60
OD_BCVA: 20/125
OD_BCVA: 20/125
OS_BCVA: 20/60

## 2023-01-18 ASSESSMENT — CORNEAL EDEMA CLINICAL DESCRIPTION: OS_CORNEALEDEMA: 1+

## 2023-01-18 ASSESSMENT — CONFRONTATIONAL VISUAL FIELD TEST (CVF)
OD_FINDINGS: FULL
OS_FINDINGS: FULL

## 2023-01-24 ENCOUNTER — AMBULATORY SURGERY CENTER (OUTPATIENT)
Dept: URBAN - METROPOLITAN AREA SURGERY 27 | Facility: SURGERY | Age: 68
Setting detail: OPHTHALMOLOGY
End: 2023-01-24
Payer: MEDICARE

## 2023-01-24 DIAGNOSIS — H25.11: ICD-10-CM

## 2023-01-24 PROCEDURE — 68841 INSJ RX ELUT IMPLT LAC CANAL: CPT | Performed by: OPHTHALMOLOGY

## 2023-01-24 PROCEDURE — 66984 XCAPSL CTRC RMVL W/O ECP: CPT | Performed by: OPHTHALMOLOGY

## 2023-01-25 ENCOUNTER — OFFICE (OUTPATIENT)
Dept: URBAN - METROPOLITAN AREA CLINIC 12 | Facility: CLINIC | Age: 68
Setting detail: OPHTHALMOLOGY
End: 2023-01-25
Payer: MEDICARE

## 2023-01-25 ENCOUNTER — RX ONLY (RX ONLY)
Age: 68
End: 2023-01-25

## 2023-01-25 DIAGNOSIS — Z96.1: ICD-10-CM

## 2023-01-25 PROCEDURE — 99024 POSTOP FOLLOW-UP VISIT: CPT | Performed by: OPTOMETRIST

## 2023-01-25 ASSESSMENT — REFRACTION_AUTOREFRACTION
OS_CYLINDER: -1.00
OD_CYLINDER: -0.25
OD_AXIS: 001
OS_SPHERE: +0.50
OS_AXIS: 030
OD_SPHERE: -0.25

## 2023-01-25 ASSESSMENT — REFRACTION_MANIFEST
OS_SPHERE: +1.00
OD_CYLINDER: -0.75
OS_CYLINDER: -0.50
OS_AXIS: 030
OD_SPHERE: +1.00
OD_AXIS: 090

## 2023-01-25 ASSESSMENT — KERATOMETRY
OS_AXISANGLE_DEGREES: 102
OD_AXISANGLE_DEGREES: 060
OS_K1POWER_DIOPTERS: 42.75
OD_K1POWER_DIOPTERS: 43.00
METHOD_AUTO_MANUAL: AUTO
OD_K2POWER_DIOPTERS: 44.00
OS_K2POWER_DIOPTERS: 44.00

## 2023-01-25 ASSESSMENT — CORNEAL EDEMA CLINICAL DESCRIPTION
OS_CORNEALEDEMA: ABSENT
OD_CORNEALEDEMA: 2+

## 2023-01-25 ASSESSMENT — SPHEQUIV_DERIVED
OD_SPHEQUIV: 0.625
OS_SPHEQUIV: 0.75
OS_SPHEQUIV: 0
OD_SPHEQUIV: -0.375

## 2023-01-25 ASSESSMENT — PACHYMETRY
OD_CT_UM: 525
OS_CT_CORRECTION: 2
OS_CT_UM: 515
OD_CT_CORRECTION: 1

## 2023-01-25 ASSESSMENT — AXIALLENGTH_DERIVED
OS_AL: 23.6379
OS_AL: 23.3482
OD_AL: 23.7389
OD_AL: 23.3509

## 2023-01-25 ASSESSMENT — CONFRONTATIONAL VISUAL FIELD TEST (CVF)
OS_FINDINGS: FULL
OD_FINDINGS: FULL

## 2023-01-25 ASSESSMENT — VISUAL ACUITY
OD_BCVA: 20/100-
OS_BCVA: 20/100-

## 2023-01-25 ASSESSMENT — TONOMETRY: OS_IOP_MMHG: 19

## 2023-02-01 ENCOUNTER — OFFICE (OUTPATIENT)
Dept: URBAN - METROPOLITAN AREA CLINIC 12 | Facility: CLINIC | Age: 68
Setting detail: OPHTHALMOLOGY
End: 2023-02-01
Payer: MEDICARE

## 2023-02-01 DIAGNOSIS — Z96.1: ICD-10-CM

## 2023-02-01 PROBLEM — H53.002 AMBLYOPIA; LEFT EYE: Status: ACTIVE | Noted: 2023-01-18

## 2023-02-01 PROBLEM — H50.112 EXOTROPIA, MONOCULAR, LEFT EYE: Status: ACTIVE | Noted: 2023-01-18

## 2023-02-01 PROCEDURE — 99024 POSTOP FOLLOW-UP VISIT: CPT | Performed by: OPTOMETRIST

## 2023-02-01 ASSESSMENT — KERATOMETRY
OS_K2POWER_DIOPTERS: 43.75
OD_K2POWER_DIOPTERS: 43.25
METHOD_AUTO_MANUAL: AUTO
OD_K1POWER_DIOPTERS: 42.75
OS_K1POWER_DIOPTERS: 43.25
OD_AXISANGLE_DEGREES: 055
OS_AXISANGLE_DEGREES: 033

## 2023-02-01 ASSESSMENT — REFRACTION_AUTOREFRACTION
OD_AXIS: 127
OS_SPHERE: -0.25
OS_AXIS: 084
OD_CYLINDER: -0.75
OD_SPHERE: +0.50
OS_CYLINDER: -0.25

## 2023-02-01 ASSESSMENT — CORNEAL EDEMA CLINICAL DESCRIPTION
OD_CORNEALEDEMA: ABSENT
OS_CORNEALEDEMA: ABSENT

## 2023-02-01 ASSESSMENT — TONOMETRY
OD_IOP_MMHG: 14
OS_IOP_MMHG: 12

## 2023-02-01 ASSESSMENT — CONFRONTATIONAL VISUAL FIELD TEST (CVF)
OD_FINDINGS: FULL
OS_FINDINGS: FULL

## 2023-02-01 ASSESSMENT — REFRACTION_MANIFEST
OS_AXIS: 030
OS_SPHERE: +1.00
OD_CYLINDER: -0.75
OD_AXIS: 090
OS_CYLINDER: -0.50
OD_SPHERE: +1.00

## 2023-02-01 ASSESSMENT — PACHYMETRY
OD_CT_UM: 525
OD_CT_CORRECTION: 1
OS_CT_UM: 515
OS_CT_CORRECTION: 2

## 2023-02-01 ASSESSMENT — AXIALLENGTH_DERIVED
OS_AL: 23.3033
OS_AL: 23.7389
OD_AL: 23.7276
OD_AL: 23.5322

## 2023-02-01 ASSESSMENT — SPHEQUIV_DERIVED
OD_SPHEQUIV: 0.125
OD_SPHEQUIV: 0.625
OS_SPHEQUIV: -0.375
OS_SPHEQUIV: 0.75

## 2023-02-01 ASSESSMENT — VISUAL ACUITY
OS_BCVA: 20/30
OD_BCVA: 20/100

## 2023-03-14 ENCOUNTER — OFFICE (OUTPATIENT)
Dept: URBAN - METROPOLITAN AREA CLINIC 12 | Facility: CLINIC | Age: 68
Setting detail: OPHTHALMOLOGY
End: 2023-03-14
Payer: MEDICARE

## 2023-03-14 DIAGNOSIS — Z96.1: ICD-10-CM

## 2023-03-14 PROCEDURE — 99024 POSTOP FOLLOW-UP VISIT: CPT | Performed by: OPTOMETRIST

## 2023-03-14 ASSESSMENT — SPHEQUIV_DERIVED
OD_SPHEQUIV: 0.5
OS_SPHEQUIV: 0
OS_SPHEQUIV: 0.75
OD_SPHEQUIV: 0.625

## 2023-03-14 ASSESSMENT — REFRACTION_MANIFEST
OS_AXIS: 030
OS_SPHERE: +1.00
OD_CYLINDER: -0.75
OD_AXIS: 090
OS_CYLINDER: -0.50
OD_SPHERE: +1.00

## 2023-03-14 ASSESSMENT — REFRACTION_AUTOREFRACTION
OS_CYLINDER: -1.00
OS_AXIS: 041
OS_SPHERE: +0.50
OD_SPHERE: +0.75
OD_CYLINDER: -0.50
OD_AXIS: 128

## 2023-03-14 ASSESSMENT — PACHYMETRY
OD_CT_UM: 525
OS_CT_UM: 515
OS_CT_CORRECTION: 2
OD_CT_CORRECTION: 1

## 2023-03-14 ASSESSMENT — VISUAL ACUITY
OD_BCVA: 20/125
OS_BCVA: 20/30-1

## 2023-03-14 ASSESSMENT — KERATOMETRY
OS_AXISANGLE_DEGREES: 116
OD_K1POWER_DIOPTERS: 42.50
OS_K2POWER_DIOPTERS: 43.75
METHOD_AUTO_MANUAL: AUTO
OD_AXISANGLE_DEGREES: 071
OD_K2POWER_DIOPTERS: 43.00
OS_K1POWER_DIOPTERS: 42.75

## 2023-03-14 ASSESSMENT — AXIALLENGTH_DERIVED
OS_AL: 23.3932
OS_AL: 23.6841
OD_AL: 23.6239
OD_AL: 23.6729

## 2023-03-14 ASSESSMENT — CONFRONTATIONAL VISUAL FIELD TEST (CVF)
OD_FINDINGS: FULL
OS_FINDINGS: FULL

## 2023-03-14 ASSESSMENT — TONOMETRY: OS_IOP_MMHG: 11

## 2024-02-05 NOTE — ED PROVIDER NOTE - NS ED MD DISPO DIVISION
02/05/24 1056   Encounter Summary   Encounter Overview/Reason  Follow-up   Service Provided For: Patient   Referral/Consult From: [facility]   Support System Family members  (sister is coming from [place], [place]. today , said RN. This  check with patient to verified if patient has ACP in place. Patient has ACP in place. Patient was not really in the mood to talk. This  offered encouragements)   Last Encounter  02/05/24   Complexity of Encounter Low   Begin Time 1045   End Time  1100   Total Time Calculated 15 min   Spiritual/Emotional needs   Type Spiritual Support   Grief, Loss, and Adjustments   Type Life Adjustments   Assessment/Intervention/Outcome   Assessment Calm;Coping;Passive   Intervention Active listening;Empowerment;Sustaining Presence/Ministry of presence;Nurtured Hope   Outcome Encouraged   Plan and Referrals   Plan/Referrals Continue Support (comment)  (as needed)        [facility]

## 2024-08-14 NOTE — ED ADULT TRIAGE NOTE - HEIGHT IN INCHES
10 Patient is a 32-year-old male past medical history of enlarged parathyroid gland is presenting to the emergency department with 3 days of right-sided ear fullness.  Also has generalized weakness.  No fevers.  Says he has nonbloody diarrhea as well.  He used a Q-tip approximately 4 to 5 days ago.  Uses Q-tips regularly.  Patient is well-appearing, vital signs are reviewed and are within normal limits.  Patient is neurologically intact ambulatory without ataxia.  Abdomen soft nontender nondistended.  Patient has minimal erythema in the bilateral external auditory canals consistent with an abrasive injury there is no evidence of edema, TMs have good light reflex bilaterally.  There is no cerumen in the external auditory canal.  Will treat symptomatically with ibuprofen acetaminophen and diphenhydramine for ear canal itchiness.  Counseled patient that Q-tips should not be used.  There is no evidence of an infection at this time.  Patient to be discharged return precautions reviewed
